# Patient Record
Sex: FEMALE | Race: WHITE | NOT HISPANIC OR LATINO | Employment: PART TIME | URBAN - METROPOLITAN AREA
[De-identification: names, ages, dates, MRNs, and addresses within clinical notes are randomized per-mention and may not be internally consistent; named-entity substitution may affect disease eponyms.]

---

## 2017-06-15 ENCOUNTER — ALLSCRIPTS OFFICE VISIT (OUTPATIENT)
Dept: OTHER | Facility: OTHER | Age: 57
End: 2017-06-15

## 2017-06-15 DIAGNOSIS — M25.561 PAIN IN RIGHT KNEE: ICD-10-CM

## 2017-06-15 DIAGNOSIS — R73.01 IMPAIRED FASTING GLUCOSE: ICD-10-CM

## 2017-06-15 DIAGNOSIS — E78.1 PURE HYPERGLYCERIDEMIA: ICD-10-CM

## 2017-06-15 DIAGNOSIS — I10 ESSENTIAL (PRIMARY) HYPERTENSION: ICD-10-CM

## 2017-10-13 ENCOUNTER — CONVERSION ENCOUNTER (OUTPATIENT)
Dept: RADIOLOGY | Facility: IMAGING CENTER | Age: 57
End: 2017-10-13

## 2017-10-13 ENCOUNTER — GENERIC CONVERSION - ENCOUNTER (OUTPATIENT)
Dept: OTHER | Facility: OTHER | Age: 57
End: 2017-10-13

## 2017-12-27 ENCOUNTER — ALLSCRIPTS OFFICE VISIT (OUTPATIENT)
Dept: OTHER | Facility: OTHER | Age: 57
End: 2017-12-27

## 2017-12-28 NOTE — PROGRESS NOTES
Assessment   1  Benign essential HTN (401 1) (I10)   2  BMI 40 0-44 9, adult (V85 41) (Z68 41)   3  Hypertriglyceridemia (272 1) (E78 1)   4  Impaired fasting glucose (790 21) (R73 01)    Plan   Benign essential HTN    · HydroCHLOROthiazide 25 MG Oral Tablet; Take 1 tablet daily   · Metoprolol Succinate  MG Oral Tablet Extended Release 24 Hour; TAKE 1 TABLET    DAILY  Benign essential HTN, Health Maintenance    · Aspirin EC 81 MG Oral Tablet Delayed Release; TAKE 1 TABLET DAILY  Knee pain, right    · * XR KNEE 4+ VW RIGHT INJURY; Status:Canceled;   PMH: Screen for colon cancer    · COLONOSCOPY; Status:Active; Requested for:92Lwv8560; Unlinked    · Osteo-Bi-Flex TABS    Discussion/Summary   Counseling Documentation With Imm: The patient was counseled regarding diagnostic results,-- prognosis,-- impressions  Patient Education: Educational resources provided:    Medication SE Review and Pt Understands Tx: Possible side effects of new medications were reviewed with the patient/guardian today  Chief Complaint   Chief Complaint Free Text Note Form: pt  presents for follow up for HTN  Review Labs 12/18/17  pt  would like Med refills      History of Present Illness   Hypertension (Follow-Up): The patient presents for follow-up of essential hypertension  She has no significant interval events  Symptoms: denies impaired vision,-- denies dyspnea,-- denies chest pain,-- denies intermittent leg claudication-- and-- denies lower extremity edema  The patient presents with complaints of mild headache, described as dull, radiating to the bilateral temporal area  Symptoms are improved by NSAIDs  Home monitoring: The patient checks her blood pressure sporadically  Blood pressure control has been good  Lifestyle: Diet: She consumes a diverse and healthy diet  Exercise: She does not exercise regularly  Smoking: She does not use tobacco Alcohol: She consumes alcohol  She reports occasional alcohol use  Drug Use: She denies drug use  (Patient intends to begin walking for exercise)  Medications: the patient is adherent with her medication regimen  -- She denies medication side effects  Additional History: Patient checks blood pressure at home, which normally runs around 130/70s  Diabetes Type II Management Pathway: The patient is being seen for follow-up for diabetes mellitus type II  Disease Course and Complications: (Fasting blood sugars were slightly elevated before hemoglobin A1c is 5 4 and the recent fasting blood sugar is totally normal continue with the weight loss and the exercise and diet to prevent the diabetes and the long run)  Hypertriglyceridemia (Brief): The patient is being seen for a routine clinic follow-up of hypertriglyceridemia  (Patient has a triglyceride level of 546 which is certainly not too bad I told her to cut down on her sugar and the sweets and will follow it up her total cholesterol LDL and HDL are within normal limits)      Review of Systems   Complete-Female:      Constitutional: no fever,-- no chills-- and-- not feeling tired  Eyes: no eyesight problems-- and-- no purulent discharge from the eyes  ENT: no earache,-- no sore throat-- and-- no hoarseness  Cardiovascular: no chest pain-- and-- no palpitations  Respiratory: no shortness of breath,-- no cough,-- no wheezing-- and-- no shortness of breath during exertion  Gastrointestinal: no abdominal pain,-- no nausea,-- no constipation-- and-- no diarrhea  Genitourinary: dysuria, but-- no pelvic pain-- and-- no incontinence  Psychiatric: no sleep disturbances  Endocrine: no muscle weakness  Active Problems   1  Anemia (285 9) (D64 9)   2  Benign essential HTN (401 1) (I10)   3  BMI 40 0-44 9, adult (V85 41) (Z68 41)   4  Cervical cancer screening (V76 2) (Z12 4)   5  Chronic venous insufficiency (459 81) (I87 2)   6   GERD without esophagitis (530 81) (K21 9)   7  Hypertriglyceridemia (272 1) (E78 1)   8  Impaired fasting glucose (790 21) (R73 01)   9  Knee pain, right (719 46) (M25 561)   10  Loss of height (781 91) (R29 890)   11  Obesity, Class III, BMI 40-49 9 (morbid obesity) (278 01) (E66 01)   12  Varicose veins (454 9) (I83 90)    Past Medical History   1  History of Acute laryngitis (464 00) (J04 0)   2  History of Headache (784 0) (R51)   3  History of hypertension (V12 59) (Z86 79)   4  History of Screening for osteoporosis (V82 81) (A72 235)    Surgical History   1  History of Gynecologic Services Thermal Endometrial Ablation    Family History   Mother    1  Family history of Benign essential HTN   2  Family history of Recent heart attack  Father    3  Family history of Benign essential HTN    Social History    · Former smoker (V15 82) (F66 790)   · No illicit drug use   · Occasional alcohol use    Current Meds    1  Aspirin EC 81 MG Oral Tablet Delayed Release; TAKE 1 TABLET DAILY; Therapy: 47ILC0893 to (Evaluate:88Xxq0511)  Requested for: 12JBI8400; Last Rx:15Jun2017     Ordered   2  Calcium + D TABS; TAKE 1 TABLET DAILY; Therapy: (Recorded:68Dxd0922) to Recorded   3  Centrum Silver Oral Tablet; Take 1 tablet daily; Therapy: (Recorded:29Dnf5668) to Recorded   4  Fish Oil 1000 MG Oral Capsule; TAKE 2 CAPSULE Daily; Therapy: 19SPS2307 to (Evaluate:48Xgt9019)  Requested for: 17YZB5152; Last Rx:15Jun2017     Ordered   5  HydroCHLOROthiazide 25 MG Oral Tablet; Take 1 tablet daily  Requested for: 19FNM5663; Last     Rx:99Iiq3085 Ordered   6  Metoprolol Succinate  MG Oral Tablet Extended Release 24 Hour; TAKE 1 TABLET DAILY; Therapy: 02WHD0538 to (Kierra Cooper)  Requested for: 92DJW0999; Last Rx:74Xjk8907     Ordered   7  Move Free TABS; Take 1 tablet daily; Therapy: (Recorded:74Ftw1254) to Recorded   8  Osteo-Bi-Flex TABS; take 2 tablet daily;      Therapy: (Recorded:20Azs2020) to Recorded  Medication List Reviewed: The medication list was reviewed and updated today  Allergies   1  morphine   2  Percocet TABS   3  Vicodin TABS  4  No Known Environmental Allergies   5  No Known Food Allergies    Vitals   Vital Signs    Recorded: 03HFF0318 09:16AM   Temperature 96 9 F, Tympanic   Heart Rate 85   Systolic 956, LUE, Sitting   Diastolic 88, LUE, Sitting   Height 5 ft 5 25 in   Weight 272 lb 4 oz   BMI Calculated 44 96   BSA Calculated 2 26   O2 Saturation 98, RA     Physical Exam        Constitutional      General appearance: No acute distress, well appearing and well nourished  Eyes      Conjunctiva and lids: No swelling, erythema or discharge  Ears, Nose, Mouth, and Throat      External inspection of ears and nose: Normal        Pulmonary      Respiratory effort: No increased work of breathing or signs of respiratory distress  Auscultation of lungs: Clear to auscultation  Cardiovascular      Auscultation of heart: Normal rate and rhythm, normal S1 and S2, without murmurs  Examination of extremities for edema and/or varicosities: Normal        Carotid pulses: Normal        Abdomen      Abdomen: Non-tender, no masses  Liver and spleen: No hepatomegaly or splenomegaly  Musculoskeletal      Gait and station: Normal        Digits and nails: Normal without clubbing or cyanosis  Inspection/palpation of joints, bones, and muscles: Normal        Skin      Skin and subcutaneous tissue: Normal without rashes or lesions  -- spider veins b/l lower legs        Psychiatric      Mood and affect: Normal           Results/Data   (1) HEMOGLOBIN A1C 92NAD6043 04:10PM Dewayne Gist      Test Name Result Flag Reference   HEMOGLOBIN A1C 5 4        Summary / No summary entered :        No summary entered  Documents attached :        [address] Work - [facility], team; Enc: [id number] - Image Encounter - [facility], team -        (Interventional Medicine) (Additional Information Document)  (1) LIPID PANEL, FASTING 64BZD1271 04:10PM Mimi Dalton      Test Name Result Flag Reference   LDL CHOLESTEROL CALCULATED 90        Summary / No summary entered :        No summary entered  Documents attached :        [address] Work - [facility], team; Enc: [id number] - Image Encounter - [facility], team -        (Interventional Medicine) (Additional Information Document)  (1) COMPREHENSIVE METABOLIC PANEL 05HYP1698 74:67JN Mimi Dalton      Test Name Result Flag Reference   [facility] 87        Summary / No summary entered :        No summary entered  Documents attached :        [address] Work - [facility], team; Enc: [id number] - Image Encounter - [facility], team -        (Interventional Medicine) (Additional Information Document)    Health Management   Cervical cancer screening   (every) THINPREP PAP; every 3 years; Next Due: 27AZI4038; Overdue  History of Other screening mammogram   Digital Bilateral Screening Mammogram With CAD; every 1 year; Last 89VAN5292; Next Due:    19TFX8919; Overdue  History of Screen for colon cancer   COLONOSCOPY; every 10 years; Next Due: 78NXH0761; Overdue  History of Screening for osteoporosis   * Dexa Scan Axial Skel (Hip, Pelvis, Spine); every 2 years; Last 84Oxo1123; Next Due: 33Ehj0699; Overdue  Health Maintenance   (every) THINPREP PAP; every 3 years; Next Due: 84AHW8909; Overdue  *VB-Depression Screening; every 1 year; Last 56CVQ5963; Next Due: 19Ppo0844;  Overdue    Signatures    Electronically signed by : Ann-Marie Conklin MD; Dec 27 2017  9:37AM EST                       (Author)

## 2018-01-14 VITALS
HEART RATE: 76 BPM | SYSTOLIC BLOOD PRESSURE: 138 MMHG | DIASTOLIC BLOOD PRESSURE: 86 MMHG | WEIGHT: 271 LBS | OXYGEN SATURATION: 94 % | BODY MASS INDEX: 45.15 KG/M2 | HEIGHT: 65 IN | TEMPERATURE: 98.3 F

## 2018-01-17 NOTE — PROGRESS NOTES
Assessment    1  Benign essential HTN (401 1) (I10)   2  Anemia (285 9) (D64 9)   3  GERD without esophagitis (530 81) (K21 9)   4  Varicose veins (454 9) (I86 8)   5  Chronic venous insufficiency (459 81) (I87 2)   6  Impaired fasting glucose (790 21) (R73 01)    Plan  Benign essential HTN    · Hydrochlorothiazide 25 MG Oral Tablet; Take 1 tablet daily   · Metoprolol Succinate  MG Oral Tablet Extended Release 24 Hour; TAKE 1 TABLET  DAILY  Benign essential HTN, Health Maintenance    · Aspirin EC 81 MG Oral Tablet Delayed Release; TAKE 1 TABLET DAILY  Chronic venous insufficiency    · JOBST Gradient Compression Stocking; Status:Complete;   Done: 36KIT7533 09:41AM  Impaired fasting glucose    · (1) GLUCOSE,  FASTING; Status:Active; Requested AVX:39XJH1926;    · (1) HEMOGLOBIN A1C; Status:Active; Requested HWW:82WFB0065;     Discussion/Summary  Counseling Documentation With Imm: The patient was counseled regarding prognosis, impressions  Chief Complaint  Chief Complaint Free Text Note Form: Patient is here today for a follow up visit for hypertension  She is experiencing pain in her legs and she is concerned about the varicosities in the lower legs  Lab tests done 4/14/16 @ [facility]  Prescription refills needed  History of Present Illness  Hypertension (Follow-Up): The patient presents for follow-up of essential hypertension  The patient states she has been doing well with her blood pressure control since the last visit  Symptoms: denies impaired vision, denies dyspnea, denies chest pain, denies intermittent leg claudication and denies lower extremity edema  Associated symptoms include headache unchanged  alleviated by heat compress/excedrine migraine    The patient presents with complaints of gradual onset of mild bilateral frontal and bilateral occipital headache (persistent )  Additional History: UA negative for protein     Urinary Frequency:   Komal Chiang presents with complaints of urinary frequency (Patient reports chronic urinary frequency x1 year (>7x/day) Reports 5 days of dysuria  Denies hesitancy  Denies back pain or suprapubic pain  Last UA noted epithelial cells)   Associated symptoms include dysuria, nocturia and low back pain, but no urinary urgency, no hematuria, no fever, no suprapubic pain, no flank pain, no nausea, no vomiting, no diarrhea, no constipation and no weight loss  Anemia (Follow-Up): The patient is being seen for follow-up of anemia  The patient reports doing well  She has had no significant interval events  Interval symptoms:  denies fatigue, denies weakness, denies shortness of breath and denies lightheadedness  Associated symptoms: no chest pain  The patient is not currently on medication for this problem  Additional history: Hgb 13 5 HCT 41%   Review of Systems  Complete-Female:   Constitutional: no fever, no chills and not feeling tired  Eyes: no eyesight problems and no purulent discharge from the eyes  ENT: no earache, no sore throat and no hoarseness  Cardiovascular: no chest pain and no palpitations  Respiratory: no shortness of breath, no cough, no wheezing and no shortness of breath during exertion  Gastrointestinal: no abdominal pain, no nausea, no constipation and no diarrhea  Genitourinary: dysuria, but no pelvic pain and no incontinence  Psychiatric: no sleep disturbances  Endocrine: no muscle weakness  Active Problems    1  Anemia (285 9) (D64 9)   2  Benign essential HTN (401 1) (I10)   3  BMI 40 0-44 9, adult (V85 41) (Z68 41)   4  GERD without esophagitis (530 81) (K21 9)   5  Loss of height (781 91) (R29 890)   6  Other screening mammogram (V76 12) (Z12 31)   7  Screen for colon cancer (V76 51) (Z12 11)   8  Varicose veins (454 9) (I86 8)    Past Medical History    1  History of Headache (784 0) (R51)   2  History of hypertension (V12 59) (Z86 79)   3   History of Screening for osteoporosis (V82 81) (S80 355)    Surgical History    1  History of Gynecologic Services Thermal Endometrial Ablation  Surgical History Reviewed: The surgical history was reviewed and updated today  Family History  Mother    1  Family history of Benign essential HTN  Father    2  Family history of Benign essential HTN  Family History Reviewed: The family history was reviewed and updated today  Social History    · Denied: History of Drug use   · Former smoker (V15 82) (I05 206)   · Occasional alcohol use  Social History Reviewed: The social history was reviewed and updated today  The social history was reviewed and is unchanged  Current Meds   1  Calcium + D TABS; TAKE 1 TABLET DAILY; Therapy: (Recorded:91Ypv7371) to Recorded   2  Centrum Silver Oral Tablet; Take 1 tablet daily; Therapy: (Recorded:67Bad4919) to Recorded   3  Hydrochlorothiazide 25 MG Oral Tablet; Take 1 tablet daily  Requested for: 70Vue0571; Last   Rx:40Qtn2181 Ordered   4  Metoprolol Succinate  MG Oral Tablet Extended Release 24 Hour; TAKE 1 TABLET DAILY; Therapy: 72ERF0137 to (Evaluate:27Jun2016)  Requested for: 07TPD4698; Last Rx:77Vvt5384   Ordered   5  Osteo-Bi-Flex TABS; take 2 tablet daily; Therapy: (Recorded:68Asl8276) to Recorded  Medication List Reviewed: The medication list was reviewed and updated today  Allergies    1  morphine   2  Percocet TABS   3  Vicodin TABS    4  No Known Environmental Allergies   5  No Known Food Allergies    Vitals  Vital Signs [Data Includes: Current Encounter]    Recorded: [id number] 08:56AM   Temperature 97 2 F, Tympanic   Heart Rate 64, L Radial   Pulse Quality Regular, L Radial   Respiration 16   Systolic 408, LUE, Sitting   Diastolic 84, LUE, Sitting   Height 5 ft 5 in   Weight 256 lb 12 8 oz   BMI Calculated 42 73   BSA Calculated 2 2   O2 Saturation 98     Physical Exam    Constitutional   General appearance: No acute distress, well appearing and well nourished      Eyes   Conjunctiva and lids: No swelling, erythema or discharge  Ears, Nose, Mouth, and Throat   External inspection of ears and nose: Normal     Pulmonary   Respiratory effort: No increased work of breathing or signs of respiratory distress  Auscultation of lungs: Clear to auscultation  Cardiovascular   Auscultation of heart: Normal rate and rhythm, normal S1 and S2, without murmurs  Examination of extremities for edema and/or varicosities: Normal     Carotid pulses: Normal     Abdomen   Abdomen: Non-tender, no masses  Liver and spleen: No hepatomegaly or splenomegaly  Musculoskeletal   Gait and station: Normal     Digits and nails: Normal without clubbing or cyanosis  Inspection/palpation of joints, bones, and muscles: Normal     Skin   Skin and subcutaneous tissue: Normal without rashes or lesions  spider veins b/l lower legs  Psychiatric   Mood and affect: Normal          Health Management  History of Screening for osteoporosis   * Dexa Scan Axial Skel (Hip, Pelvis, Spine); every 2 years; Last 01Apr2015; Next Due: 01Apr2017; Active  Other screening mammogram   Digital Bilateral Screening Mammogram With CAD; every 1 year; Last 08MFV2655; Next Due:  22RJB4968; Near Due  Screen for colon cancer   COLONOSCOPY; every 10 years; Next Due: 82UCS7888; Overdue  Health Maintenance   *VB-Depression Screening; every 1 year; Last 41CCK3605; Next Due: 82Baw9203;  Active    Signatures   Electronically signed by : Cindy Silva MD; Jun 16 2016  9:42AM EST                       (Author)

## 2018-01-22 VITALS
HEIGHT: 65 IN | HEART RATE: 85 BPM | BODY MASS INDEX: 45.36 KG/M2 | SYSTOLIC BLOOD PRESSURE: 138 MMHG | DIASTOLIC BLOOD PRESSURE: 88 MMHG | OXYGEN SATURATION: 98 % | TEMPERATURE: 96.9 F | WEIGHT: 272.25 LBS

## 2018-06-25 RX ORDER — YEAST,DRIED (S. CEREVISIAE)
1 POWDER (GRAM) ORAL DAILY
COMMUNITY
End: 2020-12-29 | Stop reason: ALTCHOICE

## 2018-06-25 RX ORDER — HYDROCHLOROTHIAZIDE 25 MG/1
1 TABLET ORAL DAILY
COMMUNITY
End: 2018-06-27 | Stop reason: SDUPTHER

## 2018-06-25 RX ORDER — METOPROLOL SUCCINATE 100 MG/1
1 TABLET, EXTENDED RELEASE ORAL DAILY
COMMUNITY
Start: 2015-02-05 | End: 2018-06-27 | Stop reason: SDUPTHER

## 2018-06-25 RX ORDER — ASPIRIN 81 MG/1
1 TABLET ORAL DAILY
COMMUNITY
Start: 2016-06-16 | End: 2018-06-27 | Stop reason: SDUPTHER

## 2018-06-25 RX ORDER — LANOLIN ALCOHOL/MO/W.PET/CERES
1 CREAM (GRAM) TOPICAL
COMMUNITY

## 2018-06-25 RX ORDER — AMOXICILLIN 500 MG
1 CAPSULE ORAL EVERY OTHER DAY
COMMUNITY
Start: 2017-06-15

## 2018-06-25 RX ORDER — MULTIVIT WITH MINERALS/LUTEIN
1 TABLET ORAL DAILY
COMMUNITY

## 2018-06-26 ENCOUNTER — OFFICE VISIT (OUTPATIENT)
Dept: INTERNAL MEDICINE CLINIC | Age: 58
End: 2018-06-26
Payer: COMMERCIAL

## 2018-06-26 VITALS
SYSTOLIC BLOOD PRESSURE: 142 MMHG | DIASTOLIC BLOOD PRESSURE: 88 MMHG | OXYGEN SATURATION: 97 % | HEART RATE: 64 BPM | WEIGHT: 267.4 LBS | TEMPERATURE: 98.4 F | HEIGHT: 66 IN | BODY MASS INDEX: 42.97 KG/M2

## 2018-06-26 DIAGNOSIS — M25.669 STIFFNESS OF JOINT OF LOWER LEG: ICD-10-CM

## 2018-06-26 DIAGNOSIS — Z12.11 COLON CANCER SCREENING: ICD-10-CM

## 2018-06-26 DIAGNOSIS — M79.604 PAIN IN BOTH LOWER EXTREMITIES: ICD-10-CM

## 2018-06-26 DIAGNOSIS — Z13.820 SCREENING FOR OSTEOPOROSIS: ICD-10-CM

## 2018-06-26 DIAGNOSIS — I10 BENIGN ESSENTIAL HTN: ICD-10-CM

## 2018-06-26 DIAGNOSIS — R73.01 IMPAIRED FASTING GLUCOSE: ICD-10-CM

## 2018-06-26 DIAGNOSIS — M79.605 PAIN IN BOTH LOWER EXTREMITIES: ICD-10-CM

## 2018-06-26 DIAGNOSIS — D64.9 ANEMIA, UNSPECIFIED TYPE: ICD-10-CM

## 2018-06-26 DIAGNOSIS — Z11.59 ENCOUNTER FOR HEPATITIS C SCREENING TEST FOR LOW RISK PATIENT: Primary | ICD-10-CM

## 2018-06-26 PROBLEM — E66.01 OBESITY, CLASS III, BMI 40-49.9 (MORBID OBESITY) (HCC): Status: ACTIVE | Noted: 2017-06-15

## 2018-06-26 PROBLEM — M25.561 KNEE PAIN, RIGHT: Status: ACTIVE | Noted: 2017-06-15

## 2018-06-26 PROBLEM — E78.1 HYPERTRIGLYCERIDEMIA: Status: ACTIVE | Noted: 2017-06-15

## 2018-06-26 PROBLEM — E66.813 OBESITY, CLASS III, BMI 40-49.9 (MORBID OBESITY): Status: ACTIVE | Noted: 2017-06-15

## 2018-06-26 PROCEDURE — 99214 OFFICE O/P EST MOD 30 MIN: CPT | Performed by: NURSE PRACTITIONER

## 2018-06-26 NOTE — PROGRESS NOTES
Assessment/Plan:      Patient will schedule a follow-up appointment in 6 months with Dr Doug Maria  I did tell patient that I will call her with her lab results and if there is any signs of rheumatoid arthritis, I recommend that she come in for a an appointment to discuss this issue  Diagnoses and all orders for this visit:    Encounter for hepatitis C screening test for low risk patient  -     Hepatitis C antibody; Future    Benign essential HTN    Patient's blood pressure is elevated on today's visit  She states that she does check her blood pressure when she visits her parents and her last check revealed that it was 120/70  I did not increase patient's medication at this time but advised her to take her blood pressure on a weekly basis and to call the office should her blood pressures be elevated consistently over 140 over 90   -     CBC and differential; Future  -     Comprehensive metabolic panel; Future  -     TSH, 3rd generation with T4 reflex; Future    Stiffness of joint of lower leg   patient does have a family history on her paternal side of rheumatoid arthritis  She states  That her father was diagnosed before he turned 48  I will order rheumatoid factors to rule out rheumatoid arthritis  She does state that Aleve does help her symptoms  Her symptoms may be related to her lower back issues (degenerative disc disease) or she may have osteoarthritis  I did encourage her to continue her walking program as this will help her symptoms  -     Cyclic citrul peptide antibody, IgG; Future  -     Sedimentation rate, automated; Future  -     C-reactive protein; Future  -     RF Screen w/ Reflex to Titer; Future    Pain in both lower extremities    See above  -     Cyclic citrul peptide antibody, IgG; Future  -     Sedimentation rate, automated; Future  -     C-reactive protein; Future  -     RF Screen w/ Reflex to Titer;  Future    Impaired fasting glucose    Patient did have fasting glucose elevations in the past   Her most recent hemoglobin A1c in December 2017 was 5 4   -     Comprehensive metabolic panel; Future    Colon cancer screening    Patient is scheduled for routine colonoscopy next month  Screening for osteoporosis    Patient is last DEXA scan was in the spring of 2015  I will reorder a DEXA scan at this time  She does have a diagnosis of loss of height  -     DXA bone density spine hip and pelvis; Future    Anemia, unspecified type  -     CBC and differential; Future  -     Comprehensive metabolic panel; Future  -     TSH, 3rd generation with T4 reflex; Future    Other orders  -     aspirin (ECOTRIN LOW STRENGTH) 81 mg EC tablet; Take 1 tablet by mouth daily  -     calcium citrate-vitamin D (CITRACAL+D) 315-200 MG-UNIT per tablet; Take 1 tablet by mouth daily  -     Multiple Vitamins-Minerals (CENTRUM SILVER) tablet; Take 1 tablet by mouth daily  -     Omega-3 Fatty Acids (FISH OIL) 1,000 mg; Take 2 capsules by mouth daily  -     Glucos-Chond-Hyal Ac-Ca Fructo (MOVE FREE JOINT HEALTH ADVANCE) TABS; Take 1 tablet by mouth daily  -     metoprolol succinate (TOPROL-XL) 100 mg 24 hr tablet; Take 1 tablet by mouth daily  -     hydrochlorothiazide (HYDRODIURIL) 25 mg tablet; Take 1 tablet by mouth daily        Subjective:      Patient ID: Saeid Jamison is a 65 y o  female  Patient is being seen for 6 month follow-up for her hypertension, obesity, hypertriglyceridemia, and impaired fasting glucose  Patient states that 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 and there was no glucose found in her urine  She was under the impression that this meant that she did not have elevated blood glucose  Patient's only complaint at this time is of bilateral lower extremity stiffness and and pain which she has had for the last couple of months  Patient does have degenerative discs in her lumbar spine  She states that Aleve does help her symptoms    She did start walking approximately 2 weeks ago now that she is out of work over summer break  Hypertension - She is exercising and is adherent to a low-salt diet  Blood pressure is well controlled at home  Cardiac symptoms: none  Patient denies: chest pain, chest pressure/discomfort, dyspnea, exertional chest pressure/discomfort, fatigue, irregular heart beat, lower extremity edema and palpitations  Cardiovascular risk factors: dyslipidemia, obesity (BMI >= 30 kg/m2) and sedentary lifestyle  Use of agents associated with hypertension: none  History of target organ damage: none  Hypertriglyceridemia - The patient does not use medications that may worsen dyslipidemias (corticosteroids, progestins, anabolic steroids, diuretics, beta-blockers, amiodarone, cyclosporine, olanzapine)  The patient exercises infrequently  The patient is not known to have coexisting coronary artery disease  Cardiac Risk Factors  Age > 45-male, > 55-female:  YES  +1  Smoking:   NO  Sig  family hx of CHD*:  YES  +1  Hypertension:   YES  +1  Diabetes:   NO  HDL < 35:   NO  HDL > 59:   NO  Total: 3    Patient has lost 5 pounds since her last visit in December 2018  She has started to walk for exercise since she has been out of work on summer break  Patient has not had repeat blood work since her appointment in December 2017  At that time her CBC and CMP were within normal limits  Her hemoglobin A1c was 5 4  Cholesterol levels revealed total cholesterol 172, triglycerides elevated at 172, HDL 48, and LDL of 90  At her last office visit and was recommended that she continue with weight loss through diet and exercise  She was also instructed to cut down on her sugar intake  The following portions of the patient's history were reviewed and updated as appropriate: allergies, current medications, past family history, past medical history, past social history, past surgical history and problem list     Review of Systems   Constitutional: Negative  HENT: Negative      Respiratory: Negative for cough, chest tightness, shortness of breath and wheezing  Cardiovascular: Negative for chest pain, palpitations and leg swelling  Gastrointestinal: Negative for abdominal distention, abdominal pain, constipation, diarrhea, nausea and vomiting  Genitourinary: Negative  Musculoskeletal:        Bilateral lower extremity stiffness and pain  Skin: Negative  Neurological: Negative for dizziness, syncope, light-headedness, numbness and headaches  Hematological: Negative  Psychiatric/Behavioral: Negative  Objective:      /88 (BP Location: Left arm, Patient Position: Sitting, Cuff Size: Large)   Pulse 64   Temp 98 4 °F (36 9 °C) (Tympanic)   Ht 5' 5 65" (1 668 m)   Wt 121 kg (267 lb 6 4 oz)   SpO2 97% Comment: room air  BMI 43 62 kg/m²      Physical Exam   Constitutional: She is oriented to person, place, and time  She appears well-developed and well-nourished  HENT:   Head: Normocephalic and atraumatic  Eyes: Conjunctivae and EOM are normal  Pupils are equal, round, and reactive to light  Neck: Normal range of motion  Neck supple  No JVD present  No thyromegaly present  Cardiovascular: Normal rate and regular rhythm  Pulmonary/Chest: Effort normal and breath sounds normal    Abdominal: Soft  Bowel sounds are normal  She exhibits no distension  There is no tenderness  Musculoskeletal: Normal range of motion  She exhibits no edema  Varicose veins noted on bilateral LE   Lymphadenopathy:     She has no cervical adenopathy  Neurological: She is alert and oriented to person, place, and time  Skin: Skin is warm and dry  Psychiatric: She has a normal mood and affect   Her behavior is normal  Judgment and thought content normal

## 2018-06-27 DIAGNOSIS — I10 ESSENTIAL HYPERTENSION, BENIGN: Primary | ICD-10-CM

## 2018-06-27 RX ORDER — ASPIRIN 81 MG/1
81 TABLET ORAL DAILY
Qty: 90 TABLET | Refills: 1 | Status: SHIPPED | OUTPATIENT
Start: 2018-06-27 | End: 2019-04-04 | Stop reason: SDUPTHER

## 2018-06-27 RX ORDER — METOPROLOL SUCCINATE 100 MG/1
100 TABLET, EXTENDED RELEASE ORAL DAILY
Qty: 90 TABLET | Refills: 1 | Status: SHIPPED | OUTPATIENT
Start: 2018-06-27 | End: 2019-01-02 | Stop reason: SDUPTHER

## 2018-06-27 RX ORDER — HYDROCHLOROTHIAZIDE 25 MG/1
25 TABLET ORAL DAILY
Qty: 90 TABLET | Refills: 1 | Status: SHIPPED | OUTPATIENT
Start: 2018-06-27 | End: 2018-12-20 | Stop reason: SDUPTHER

## 2018-07-06 ENCOUNTER — HOSPITAL ENCOUNTER (OUTPATIENT)
Dept: RADIOLOGY | Facility: IMAGING CENTER | Age: 58
Discharge: HOME | End: 2018-07-06
Payer: COMMERCIAL

## 2018-07-06 DIAGNOSIS — Z13.820 SCREENING FOR OSTEOPOROSIS: ICD-10-CM

## 2018-07-06 PROCEDURE — 77080 DXA BONE DENSITY AXIAL: CPT

## 2018-07-10 ENCOUNTER — TELEPHONE (OUTPATIENT)
Dept: INTERNAL MEDICINE CLINIC | Facility: CLINIC | Age: 58
End: 2018-07-10

## 2018-07-10 NOTE — TELEPHONE ENCOUNTER
Patient should stop aspirin according to the recommendation of the doctor doing the colonoscopy    She does not need to take her blood pressure the morning of the colonoscopy but can take it later on in the day when she eats a regular meal

## 2018-07-10 NOTE — TELEPHONE ENCOUNTER
PATIENT CALLED SHE IS SCHEDULED FOR A COLONOSCOPY AND WONDERING ABOUT TAKING HER BP MEDICATION DAY OF?  AND ALSO SHE TAKES ASPIRIN AND WANTS TO KNOW IF SHE SHOULD STOP SO MANY DAYS BEFORE PROCEDURE, ITS TUES 7/17/18

## 2018-08-06 ENCOUNTER — TELEPHONE (OUTPATIENT)
Dept: INTERNAL MEDICINE CLINIC | Age: 58
End: 2018-08-06

## 2018-08-06 DIAGNOSIS — I10 ESSENTIAL HYPERTENSION, BENIGN: ICD-10-CM

## 2018-08-06 NOTE — TELEPHONE ENCOUNTER
Will have patient set up an appointment to be seen  Patient reports elevated SBP 140s and DBP's 70s and 80s  She is unsure how accurate her BP cuff is

## 2018-08-06 NOTE — TELEPHONE ENCOUNTER
Iram---Patient called her b/p is elevated still she would like to know if you would like her to up her toprol she just got a new script of the 100mg she wants to know if she should double her dose it is ok to leave a message

## 2018-08-09 ENCOUNTER — OFFICE VISIT (OUTPATIENT)
Dept: INTERNAL MEDICINE CLINIC | Facility: CLINIC | Age: 58
End: 2018-08-09
Payer: COMMERCIAL

## 2018-08-09 VITALS
BODY MASS INDEX: 42.72 KG/M2 | WEIGHT: 265.8 LBS | SYSTOLIC BLOOD PRESSURE: 132 MMHG | HEART RATE: 74 BPM | DIASTOLIC BLOOD PRESSURE: 90 MMHG | TEMPERATURE: 98 F | OXYGEN SATURATION: 97 % | HEIGHT: 66 IN

## 2018-08-09 DIAGNOSIS — I10 BENIGN ESSENTIAL HYPERTENSION: Primary | ICD-10-CM

## 2018-08-09 PROCEDURE — 99213 OFFICE O/P EST LOW 20 MIN: CPT | Performed by: NURSE PRACTITIONER

## 2018-08-09 RX ORDER — LISINOPRIL 10 MG/1
10 TABLET ORAL DAILY
Qty: 30 TABLET | Refills: 2 | Status: SHIPPED | OUTPATIENT
Start: 2018-08-09 | End: 2018-08-24 | Stop reason: DRUGHIGH

## 2018-08-09 NOTE — PROGRESS NOTES
Assessment/Plan:     Diagnoses and all orders for this visit:    Benign essential hypertension    Patient's blood pressure is still elevated and I will add lisinopril 10 mg daily to her anti hypertensive regimen  I did inform her of a possible side effect of dry cough  She states her 's been on this medication for years and never developed a cough  I did tell her that she could develop cough within a week a month I year 10 years and if this develops she should stop the medication and call our office  She will come to the office in 2 weeks for just a blood pressure check  If her blood pressure is controlled at that time, her medication can be changed to a lisinopril HCTZ combo when she is in need of refills in the future  -     lisinopril (ZESTRIL) 10 mg tablet; Take 1 tablet (10 mg total) by mouth daily        Subjective:      Patient ID: Fletcher Hoang is a 65 y o  female  Patient being seen in the office today due to elevated blood pressure at home  She called the office with concern over an elevated blood pressure and questioned if she should increase her Toprol  mg to 200 mg daily  Today she states that she used to be on 200 mg of Toprol daily but this was decreased to 100 when her blood pressure is controlled  She also takes hydrochlorothiazide 25 mg daily  On today's visit patient's pulse was 74 patient states that her heart rate is usually between the 70s and 80s  On her last visit her pulse was 64  she that she would like her blood pressure lowered due to risk of Alzheimer's  She read an article relating that to and is very concerned with this issue  The following portions of the patient's history were reviewed and updated as appropriate: allergies, current medications, past family history, past medical history, past social history, past surgical history and problem list     Review of Systems   Constitutional: Negative      Respiratory: Negative for cough, chest tightness, shortness of breath and wheezing  Cardiovascular: Negative for chest pain, palpitations and leg swelling  Gastrointestinal: Negative for abdominal distention, abdominal pain, constipation, diarrhea, nausea and vomiting  Skin: Negative  Neurological: Negative for dizziness, light-headedness and headaches  Hematological: Negative  Objective:    /90 (BP Location: Left arm, Patient Position: Sitting, Cuff Size: Large)   Pulse 74   Temp 98 °F (36 7 °C) (Tympanic)   Ht 5' 5 5" (1 664 m)   Wt 121 kg (265 lb 12 8 oz)   SpO2 97%   BMI 43 56 kg/m²      Physical Exam   Constitutional: She is oriented to person, place, and time  She appears well-developed and well-nourished  HENT:   Head: Normocephalic and atraumatic  Eyes: Conjunctivae and EOM are normal  Pupils are equal, round, and reactive to light  Neck: Normal range of motion  Neck supple  No JVD present  No thyromegaly present  Cardiovascular: Normal rate and regular rhythm  Pulmonary/Chest: Effort normal and breath sounds normal    Musculoskeletal: Normal range of motion  She exhibits no edema  Lymphadenopathy:     She has no cervical adenopathy  Neurological: She is alert and oriented to person, place, and time  Skin: Skin is warm and dry

## 2018-08-09 NOTE — PATIENT INSTRUCTIONS
Continue Toprol and HCTZ as directed  Start Lisinopril 10 mg daily  Call office should you have any side effects from new medication  Schedule BP check (nurse visit) in 3-4 weeks

## 2018-08-24 ENCOUNTER — CLINICAL SUPPORT (OUTPATIENT)
Dept: INTERNAL MEDICINE CLINIC | Facility: CLINIC | Age: 58
End: 2018-08-24
Payer: COMMERCIAL

## 2018-08-24 DIAGNOSIS — I10 BENIGN ESSENTIAL HYPERTENSION: ICD-10-CM

## 2018-08-24 DIAGNOSIS — I10 BENIGN ESSENTIAL HYPERTENSION: Primary | ICD-10-CM

## 2018-08-24 PROCEDURE — 99211 OFF/OP EST MAY X REQ PHY/QHP: CPT

## 2018-08-24 RX ORDER — LISINOPRIL 20 MG/1
20 TABLET ORAL DAILY
Qty: 30 TABLET | Refills: 1 | Status: SHIPPED | OUTPATIENT
Start: 2018-08-24 | End: 2018-08-27 | Stop reason: SDUPTHER

## 2018-08-24 RX ORDER — LISINOPRIL 10 MG/1
10 TABLET ORAL DAILY
Qty: 90 TABLET | Refills: 1 | Status: CANCELLED | OUTPATIENT
Start: 2018-08-24

## 2018-08-24 NOTE — TELEPHONE ENCOUNTER
Will d/w tyree pt was to see her back in 2 weeks for BP check, also recommended combo med if well controlled, tyree also sent 30 with 2 refills on aug 8th

## 2018-08-24 NOTE — TELEPHONE ENCOUNTER
Phoned patient and left message for her to return my call regarding her BP today  Based on today's BP, she will need her anti-hypertensive meds changed or increased  I asked her to call me back

## 2018-08-24 NOTE — TELEPHONE ENCOUNTER
Spoke with patient  She states her BP today was 158/85 then 128/82  Patient is presently taking toprol  mg daily, lisinopril 10 mg daily and HCTZ daily  Will increase lisiniopril to 20 mg daily  BP check in 2-3 weeks with further discussion of her medications based on that result

## 2018-08-27 DIAGNOSIS — I10 BENIGN ESSENTIAL HYPERTENSION: ICD-10-CM

## 2018-08-27 RX ORDER — LISINOPRIL 20 MG/1
20 TABLET ORAL DAILY
Qty: 90 TABLET | Refills: 1 | Status: SHIPPED | OUTPATIENT
Start: 2018-08-27 | End: 2018-12-20 | Stop reason: SDUPTHER

## 2018-09-25 ENCOUNTER — TELEPHONE (OUTPATIENT)
Dept: INTERNAL MEDICINE CLINIC | Facility: CLINIC | Age: 58
End: 2018-09-25

## 2018-09-25 NOTE — TELEPHONE ENCOUNTER
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

## 2018-10-17 ENCOUNTER — TELEPHONE (OUTPATIENT)
Dept: INTERNAL MEDICINE CLINIC | Facility: CLINIC | Age: 58
End: 2018-10-17

## 2018-10-17 NOTE — TELEPHONE ENCOUNTER
Calling for lab results that she had back in August   She was just following up because she never heard anything

## 2018-10-18 NOTE — TELEPHONE ENCOUNTER
Pt called  Reviewed labs  Slight elevation in Sed and CRP - reviewed with pt  She reports she is continuing to having LE myalgia  Patient states that Dr Yumiko Partida told her a long time ago that her varicose veins could be contributing  Patient was requesting a referral to vascular for repair  Discussed with patient that she should be receding to evaluate lower extremity myalgia, need for appropriate referral as it has been a while since she has been seen for this reason  Call transferred to front to make appt  No questions

## 2018-10-25 ENCOUNTER — OFFICE VISIT (OUTPATIENT)
Dept: INTERNAL MEDICINE CLINIC | Age: 58
End: 2018-10-25
Payer: COMMERCIAL

## 2018-10-25 VITALS
WEIGHT: 265.8 LBS | HEART RATE: 73 BPM | BODY MASS INDEX: 42.72 KG/M2 | HEIGHT: 66 IN | OXYGEN SATURATION: 99 % | SYSTOLIC BLOOD PRESSURE: 118 MMHG | DIASTOLIC BLOOD PRESSURE: 78 MMHG | TEMPERATURE: 97.9 F

## 2018-10-25 DIAGNOSIS — D64.9 ANEMIA, UNSPECIFIED TYPE: ICD-10-CM

## 2018-10-25 DIAGNOSIS — M79.604 PAIN IN BOTH LOWER EXTREMITIES: ICD-10-CM

## 2018-10-25 DIAGNOSIS — Z12.39 SCREENING FOR BREAST CANCER: ICD-10-CM

## 2018-10-25 DIAGNOSIS — R73.01 IMPAIRED FASTING GLUCOSE: Primary | ICD-10-CM

## 2018-10-25 DIAGNOSIS — Z12.11 SCREENING FOR COLON CANCER: ICD-10-CM

## 2018-10-25 DIAGNOSIS — I10 BENIGN ESSENTIAL HTN: ICD-10-CM

## 2018-10-25 DIAGNOSIS — M79.605 PAIN IN BOTH LOWER EXTREMITIES: ICD-10-CM

## 2018-10-25 PROCEDURE — 99214 OFFICE O/P EST MOD 30 MIN: CPT | Performed by: INTERNAL MEDICINE

## 2018-10-25 NOTE — PROGRESS NOTES
Assessment/Plan:    No problem-specific Assessment & Plan notes found for this encounter  Diagnoses and all orders for this visit:    Impaired fasting glucose   blood workup reviewed with the patient the fasting blood sugar which was high before it is totally normal  Screening for colon cancer  -     Ambulatory referral to Gastroenterology; Future    Benign essential HTN   hypertension is very well controlled  Anemia, unspecified type   anemia is resolved  Pain in both lower extremities   complaining of the pain below the knee mostly in the calf area and also behind the knee area mostly at night when she relax or when she reclines herself and put the legs up,  No chest pain or shortness of breath no increasing swelling of the leg she had a bilateral spider and the varicose veins  She did have problems with her knee with some arthritic changes and also meniscal tear  Her sed rate and C-reactive proteins are elevated we will get the CK and aldolase for assessment of the muscle inflammation I do not think so it is DVT and also I do not think so it is neuropathy  Certainly for long-term I recommend diet exercise and weight loss which will improve her lower extremity pain and also the knee pain        Subjective:    bilateral lower extremity pain as given above   Patient ID: Braulio Kumar is a 65 y o  female  HPI    The following portions of the patient's history were reviewed and updated as appropriate: allergies, current medications, past family history, past medical history, past social history, past surgical history and problem list     Review of Systems   Constitutional: Negative  Respiratory: Negative for cough, chest tightness, shortness of breath and wheezing  Cardiovascular: Negative for chest pain, palpitations and leg swelling  Gastrointestinal: Negative for abdominal distention, abdominal pain, constipation, diarrhea, nausea and vomiting  Musculoskeletal: Positive for arthralgias     Skin: Negative  Neurological: Negative for dizziness, light-headedness and headaches  Hematological: Negative            Past Medical History:   Diagnosis Date    Allergic     Anemia     Enlarged thyroid     GERD (gastroesophageal reflux disease)     Hypertension     Obesity          Current Outpatient Prescriptions:     aspirin (ECOTRIN LOW STRENGTH) 81 mg EC tablet, Take 1 tablet (81 mg total) by mouth daily, Disp: 90 tablet, Rfl: 1    calcium citrate-vitamin D (CITRACAL+D) 315-200 MG-UNIT per tablet, Take 1 tablet by mouth daily, Disp: , Rfl:     Glucos-Chond-Hyal Ac-Ca Fructo (MOVE FREE JOINT HEALTH ADVANCE) TABS, Take 1 tablet by mouth daily, Disp: , Rfl:     hydrochlorothiazide (HYDRODIURIL) 25 mg tablet, Take 1 tablet (25 mg total) by mouth daily, Disp: 90 tablet, Rfl: 1    lisinopril (ZESTRIL) 20 mg tablet, Take 1 tablet (20 mg total) by mouth daily, Disp: 90 tablet, Rfl: 1    metoprolol succinate (TOPROL-XL) 100 mg 24 hr tablet, Take 1 tablet (100 mg total) by mouth daily, Disp: 90 tablet, Rfl: 1    Multiple Vitamins-Minerals (CENTRUM SILVER) tablet, Take 1 tablet by mouth daily, Disp: , Rfl:     Omega-3 Fatty Acids (FISH OIL) 1,000 mg, Take 2 capsules by mouth daily  , Disp: , Rfl:     Allergies   Allergen Reactions    Hydrocodone-Acetaminophen GI Intolerance    Morphine Vomiting    Percocet [Oxycodone-Acetaminophen] GI Intolerance       Social History   Past Surgical History:   Procedure Laterality Date    ELBOW SURGERY      [place] no surgery    ENDOMETRIAL ABLATION      WISDOM TOOTH EXTRACTION       Family History   Problem Relation Age of Onset    Hypertension Mother         benign essential     Heart attack Mother         recent     Hypertension Father         benign essential     Arthritis Father        Objective:  /78 (BP Location: Left arm, Patient Position: Sitting, Cuff Size: Large)   Pulse 73   Temp 97 9 °F (36 6 °C) (Tympanic)   Ht 5' 5 55" (1 665 m)   Wt 121 kg (265 lb 12 8 oz)   SpO2 99%   BMI 43 49 kg/m²        Physical Exam   Constitutional: She is oriented to person, place, and time  She appears well-developed and well-nourished  HENT:   Head: Normocephalic and atraumatic  Eyes: Pupils are equal, round, and reactive to light  Conjunctivae and EOM are normal    Neck: Normal range of motion  Neck supple  No JVD present  No thyromegaly present  Cardiovascular: Normal rate and regular rhythm  Pulmonary/Chest: Effort normal and breath sounds normal    Musculoskeletal: Normal range of motion  She exhibits no edema  Lymphadenopathy:     She has no cervical adenopathy  Neurological: She is alert and oriented to person, place, and time  Skin: Skin is warm and dry  Nursing note and vitals reviewed  No results found for this or any previous visit (from the past 672 hour(s))

## 2018-10-29 ENCOUNTER — HOSPITAL ENCOUNTER (OUTPATIENT)
Dept: RADIOLOGY | Facility: IMAGING CENTER | Age: 58
Discharge: HOME | End: 2018-10-29
Payer: COMMERCIAL

## 2018-10-29 ENCOUNTER — APPOINTMENT (OUTPATIENT)
Dept: LAB | Facility: IMAGING CENTER | Age: 58
End: 2018-10-29
Payer: COMMERCIAL

## 2018-10-29 ENCOUNTER — TRANSCRIBE ORDERS (OUTPATIENT)
Dept: ADMINISTRATIVE | Facility: HOSPITAL | Age: 58
End: 2018-10-29

## 2018-10-29 DIAGNOSIS — M79.604 PAIN IN BOTH LOWER EXTREMITIES: ICD-10-CM

## 2018-10-29 DIAGNOSIS — R73.01 IMPAIRED FASTING GLUCOSE: ICD-10-CM

## 2018-10-29 DIAGNOSIS — I10 BENIGN ESSENTIAL HTN: ICD-10-CM

## 2018-10-29 DIAGNOSIS — M79.605 PAIN IN BOTH LOWER EXTREMITIES: ICD-10-CM

## 2018-10-29 DIAGNOSIS — D64.9 ANEMIA, UNSPECIFIED TYPE: ICD-10-CM

## 2018-10-29 DIAGNOSIS — Z11.59 ENCOUNTER FOR HEPATITIS C SCREENING TEST FOR LOW RISK PATIENT: ICD-10-CM

## 2018-10-29 LAB
ALBUMIN SERPL BCP-MCNC: 3.2 G/DL (ref 3.5–5)
ALP SERPL-CCNC: 106 U/L (ref 46–116)
ALT SERPL W P-5'-P-CCNC: 32 U/L (ref 12–78)
ANION GAP SERPL CALCULATED.3IONS-SCNC: 6 MMOL/L (ref 4–13)
AST SERPL W P-5'-P-CCNC: 24 U/L (ref 5–45)
BASOPHILS # BLD AUTO: 0.09 THOUSANDS/ΜL (ref 0–0.1)
BASOPHILS NFR BLD AUTO: 1 % (ref 0–1)
BILIRUB SERPL-MCNC: 0.25 MG/DL (ref 0.2–1)
BUN SERPL-MCNC: 17 MG/DL (ref 5–25)
CALCIUM SERPL-MCNC: 8.9 MG/DL (ref 8.3–10.1)
CHLORIDE SERPL-SCNC: 103 MMOL/L (ref 100–108)
CO2 SERPL-SCNC: 28 MMOL/L (ref 21–32)
CREAT SERPL-MCNC: 0.64 MG/DL (ref 0.6–1.3)
EOSINOPHIL # BLD AUTO: 0.23 THOUSAND/ΜL (ref 0–0.61)
EOSINOPHIL NFR BLD AUTO: 3 % (ref 0–6)
ERYTHROCYTE [DISTWIDTH] IN BLOOD BY AUTOMATED COUNT: 12.4 % (ref 11.6–15.1)
GFR SERPL CREATININE-BSD FRML MDRD: 100 ML/MIN/1.73SQ M
GLUCOSE SERPL-MCNC: 97 MG/DL (ref 65–140)
HCT VFR BLD AUTO: 40.7 % (ref 34.8–46.1)
HGB BLD-MCNC: 13.2 G/DL (ref 11.5–15.4)
IMM GRANULOCYTES # BLD AUTO: 0.03 THOUSAND/UL (ref 0–0.2)
IMM GRANULOCYTES NFR BLD AUTO: 0 % (ref 0–2)
LYMPHOCYTES # BLD AUTO: 2.03 THOUSANDS/ΜL (ref 0.6–4.47)
LYMPHOCYTES NFR BLD AUTO: 22 % (ref 14–44)
MCH RBC QN AUTO: 29.8 PG (ref 26.8–34.3)
MCHC RBC AUTO-ENTMCNC: 32.4 G/DL (ref 31.4–37.4)
MCV RBC AUTO: 92 FL (ref 82–98)
MONOCYTES # BLD AUTO: 0.8 THOUSAND/ΜL (ref 0.17–1.22)
MONOCYTES NFR BLD AUTO: 9 % (ref 4–12)
NEUTROPHILS # BLD AUTO: 6.09 THOUSANDS/ΜL (ref 1.85–7.62)
NEUTS SEG NFR BLD AUTO: 65 % (ref 43–75)
NRBC BLD AUTO-RTO: 0 /100 WBCS
PLATELET # BLD AUTO: 239 THOUSANDS/UL (ref 149–390)
PMV BLD AUTO: 11.6 FL (ref 8.9–12.7)
POTASSIUM SERPL-SCNC: 3.7 MMOL/L (ref 3.5–5.3)
PROT SERPL-MCNC: 7.4 G/DL (ref 6.4–8.2)
RBC # BLD AUTO: 4.43 MILLION/UL (ref 3.81–5.12)
SODIUM SERPL-SCNC: 137 MMOL/L (ref 136–145)
TSH SERPL DL<=0.05 MIU/L-ACNC: 0.56 UIU/ML (ref 0.36–3.74)
WBC # BLD AUTO: 9.27 THOUSAND/UL (ref 4.31–10.16)

## 2018-10-29 PROCEDURE — 73565 X-RAY EXAM OF KNEES: CPT

## 2018-10-29 PROCEDURE — 84443 ASSAY THYROID STIM HORMONE: CPT

## 2018-10-29 PROCEDURE — 86803 HEPATITIS C AB TEST: CPT

## 2018-10-29 PROCEDURE — 36415 COLL VENOUS BLD VENIPUNCTURE: CPT

## 2018-10-29 PROCEDURE — 85025 COMPLETE CBC W/AUTO DIFF WBC: CPT

## 2018-10-29 PROCEDURE — 80053 COMPREHEN METABOLIC PANEL: CPT

## 2018-10-30 LAB — HCV AB SER QL: NORMAL

## 2018-11-14 ENCOUNTER — OFFICE VISIT (OUTPATIENT)
Dept: INTERNAL MEDICINE CLINIC | Age: 58
End: 2018-11-14
Payer: COMMERCIAL

## 2018-11-14 VITALS
OXYGEN SATURATION: 97 % | DIASTOLIC BLOOD PRESSURE: 80 MMHG | HEART RATE: 82 BPM | TEMPERATURE: 97.7 F | SYSTOLIC BLOOD PRESSURE: 130 MMHG

## 2018-11-14 DIAGNOSIS — G89.29 CHRONIC PAIN OF RIGHT KNEE: ICD-10-CM

## 2018-11-14 DIAGNOSIS — I10 BENIGN ESSENTIAL HTN: ICD-10-CM

## 2018-11-14 DIAGNOSIS — M25.561 CHRONIC PAIN OF RIGHT KNEE: ICD-10-CM

## 2018-11-14 DIAGNOSIS — R73.01 IMPAIRED FASTING GLUCOSE: Primary | ICD-10-CM

## 2018-11-14 DIAGNOSIS — I87.2 CHRONIC VENOUS INSUFFICIENCY: ICD-10-CM

## 2018-11-14 PROCEDURE — 3075F SYST BP GE 130 - 139MM HG: CPT | Performed by: INTERNAL MEDICINE

## 2018-11-14 PROCEDURE — 99213 OFFICE O/P EST LOW 20 MIN: CPT | Performed by: INTERNAL MEDICINE

## 2018-11-14 PROCEDURE — 3079F DIAST BP 80-89 MM HG: CPT | Performed by: INTERNAL MEDICINE

## 2018-11-14 NOTE — PROGRESS NOTES
Assessment/Plan:    No problem-specific Assessment & Plan notes found for this encounter  Diagnoses and all orders for this visit:    Impaired fasting glucose  Her fasting blood sugar was pretty normal this time  Benign essential HTN  Blood pressure is normal 130/80 her kidney function liver functions electrolytes were normal  Chronic venous insufficiency    Chronic pain of right knee     she still have a lot of pain in the knee, mostly on the left than on the right side below x-rays of the both knee shows degenerative disease in the medial compartment  We will recommend her to see the orthopedic doctor for further evaluation and treatment, her TSH was normal    Subjective:   Complaining of bilateral knee pain more on the left than on the right side   Patient ID: Bernarda Rojo is a 65 y o  female  HPI    The following portions of the patient's history were reviewed and updated as appropriate: allergies, current medications, past family history, past medical history, past social history, past surgical history and problem list     Review of Systems   Constitutional: Negative  Respiratory: Negative for cough, chest tightness, shortness of breath and wheezing  Cardiovascular: Negative for chest pain, palpitations and leg swelling  Gastrointestinal: Negative for abdominal distention, abdominal pain, constipation, diarrhea, nausea and vomiting  Musculoskeletal: Positive for arthralgias  Skin: Negative  Neurological: Negative for dizziness, light-headedness and headaches  Hematological: Negative            Past Medical History:   Diagnosis Date    Allergic     Anemia     Enlarged thyroid     GERD (gastroesophageal reflux disease)     Hypertension     Obesity          Current Outpatient Prescriptions:     aspirin (ECOTRIN LOW STRENGTH) 81 mg EC tablet, Take 1 tablet (81 mg total) by mouth daily, Disp: 90 tablet, Rfl: 1    calcium citrate-vitamin D (CITRACAL+D) 315-200 MG-UNIT per tablet, Take 1 tablet by mouth daily, Disp: , Rfl:     Glucos-Chond-Hyal Ac-Ca Fructo (MOVE FREE JOINT HEALTH ADVANCE) TABS, Take 1 tablet by mouth daily, Disp: , Rfl:     hydrochlorothiazide (HYDRODIURIL) 25 mg tablet, Take 1 tablet (25 mg total) by mouth daily, Disp: 90 tablet, Rfl: 1    lisinopril (ZESTRIL) 20 mg tablet, Take 1 tablet (20 mg total) by mouth daily, Disp: 90 tablet, Rfl: 1    metoprolol succinate (TOPROL-XL) 100 mg 24 hr tablet, Take 1 tablet (100 mg total) by mouth daily, Disp: 90 tablet, Rfl: 1    Multiple Vitamins-Minerals (CENTRUM SILVER) tablet, Take 1 tablet by mouth daily, Disp: , Rfl:     Omega-3 Fatty Acids (FISH OIL) 1,000 mg, Take 2 capsules by mouth daily  , Disp: , Rfl:     Allergies   Allergen Reactions    Hydrocodone-Acetaminophen GI Intolerance    Morphine Vomiting    Percocet [Oxycodone-Acetaminophen] GI Intolerance       Social History   Past Surgical History:   Procedure Laterality Date    ELBOW SURGERY      [place]oken no surgery    ENDOMETRIAL ABLATION      WISDOM TOOTH EXTRACTION       Family History   Problem Relation Age of Onset    Hypertension Mother         benign essential     Heart attack Mother         recent     Hypertension Father         benign essential     Arthritis Father        Objective:  /80 (BP Location: Left arm, Patient Position: Sitting, Cuff Size: Adult)   Pulse 82   Temp 97 7 °F (36 5 °C) (Tympanic)   SpO2 97%        Physical Exam   Constitutional: She is oriented to person, place, and time  She appears well-developed and well-nourished  HENT:   Head: Normocephalic and atraumatic  Eyes: Pupils are equal, round, and reactive to light  Conjunctivae and EOM are normal    Neck: Normal range of motion  Neck supple  No JVD present  No thyromegaly present  Cardiovascular: Normal rate and regular rhythm  Pulmonary/Chest: Effort normal and breath sounds normal    Musculoskeletal: Normal range of motion  She exhibits no edema  Lymphadenopathy:     She has no cervical adenopathy  Neurological: She is alert and oriented to person, place, and time  Skin: Skin is warm and dry  Nursing note and vitals reviewed          Recent Results (from the past 672 hour(s))   Hepatitis C antibody    Collection Time: 10/29/18  9:05 AM   Result Value Ref Range    Hepatitis C Ab Non-reactive Non-reactive   CBC and differential    Collection Time: 10/29/18  9:05 AM   Result Value Ref Range    WBC 9 27 4 31 - 10 16 Thousand/uL    RBC 4 43 3 81 - 5 12 Million/uL    Hemoglobin 13 2 11 5 - 15 4 g/dL    Hematocrit 40 7 34 8 - 46 1 %    MCV 92 82 - 98 fL    MCH 29 8 26 8 - 34 3 pg    MCHC 32 4 31 4 - 37 4 g/dL    RDW 12 4 11 6 - 15 1 %    MPV 11 6 8 9 - 12 7 fL    Platelets 750 885 - 424 Thousands/uL    nRBC 0 /100 WBCs    Neutrophils Relative 65 43 - 75 %    Immat GRANS % 0 0 - 2 %    Lymphocytes Relative 22 14 - 44 %    Monocytes Relative 9 4 - 12 %    Eosinophils Relative 3 0 - 6 %    Basophils Relative 1 0 - 1 %    Neutrophils Absolute 6 09 1 85 - 7 62 Thousands/µL    Immature Grans Absolute 0 03 0 00 - 0 20 Thousand/uL    Lymphocytes Absolute 2 03 0 60 - 4 47 Thousands/µL    Monocytes Absolute 0 80 0 17 - 1 22 Thousand/µL    Eosinophils Absolute 0 23 0 00 - 0 61 Thousand/µL    Basophils Absolute 0 09 0 00 - 0 10 Thousands/µL   Comprehensive metabolic panel    Collection Time: 10/29/18  9:05 AM   Result Value Ref Range    Sodium 137 136 - 145 mmol/L    Potassium 3 7 3 5 - 5 3 mmol/L    Chloride 103 100 - 108 mmol/L    CO2 28 21 - 32 mmol/L    ANION GAP 6 4 - 13 mmol/L    BUN 17 5 - 25 mg/dL    Creatinine 0 64 0 60 - 1 30 mg/dL    Glucose 97 65 - 140 mg/dL    Calcium 8 9 8 3 - 10 1 mg/dL    AST 24 5 - 45 U/L    ALT 32 12 - 78 U/L    Alkaline Phosphatase 106 46 - 116 U/L    Total Protein 7 4 6 4 - 8 2 g/dL    Albumin 3 2 (L) 3 5 - 5 0 g/dL    Total Bilirubin 0 25 0 20 - 1 00 mg/dL    eGFR 100 ml/min/1 73sq m   TSH, 3rd generation with T4 reflex Collection Time: 10/29/18  9:05 AM   Result Value Ref Range    TSH 3RD GENERATON 0 555 0 358 - 3 740 uIU/mL

## 2018-11-26 ENCOUNTER — OFFICE VISIT (OUTPATIENT)
Dept: OBGYN CLINIC | Facility: OTHER | Age: 58
End: 2018-11-26
Payer: COMMERCIAL

## 2018-11-26 ENCOUNTER — APPOINTMENT (OUTPATIENT)
Dept: RADIOLOGY | Facility: OTHER | Age: 58
End: 2018-11-26
Payer: COMMERCIAL

## 2018-11-26 ENCOUNTER — HOSPITAL ENCOUNTER (OUTPATIENT)
Dept: RADIOLOGY | Facility: IMAGING CENTER | Age: 58
Discharge: HOME | End: 2018-11-26
Payer: COMMERCIAL

## 2018-11-26 VITALS
SYSTOLIC BLOOD PRESSURE: 139 MMHG | HEIGHT: 66 IN | HEART RATE: 70 BPM | WEIGHT: 268 LBS | DIASTOLIC BLOOD PRESSURE: 78 MMHG | BODY MASS INDEX: 43.07 KG/M2

## 2018-11-26 VITALS — BODY MASS INDEX: 43.07 KG/M2 | WEIGHT: 268 LBS | HEIGHT: 66 IN

## 2018-11-26 DIAGNOSIS — M25.561 CHRONIC PAIN OF RIGHT KNEE: ICD-10-CM

## 2018-11-26 DIAGNOSIS — M25.562 LEFT KNEE PAIN, UNSPECIFIED CHRONICITY: ICD-10-CM

## 2018-11-26 DIAGNOSIS — Z12.39 SCREENING FOR BREAST CANCER: ICD-10-CM

## 2018-11-26 DIAGNOSIS — M17.0 OSTEOARTHRITIS OF BOTH KNEES, UNSPECIFIED OSTEOARTHRITIS TYPE: ICD-10-CM

## 2018-11-26 DIAGNOSIS — M25.562 LEFT KNEE PAIN, UNSPECIFIED CHRONICITY: Primary | ICD-10-CM

## 2018-11-26 DIAGNOSIS — G89.29 CHRONIC PAIN OF RIGHT KNEE: ICD-10-CM

## 2018-11-26 PROCEDURE — 77067 SCR MAMMO BI INCL CAD: CPT

## 2018-11-26 PROCEDURE — 73562 X-RAY EXAM OF KNEE 3: CPT

## 2018-11-26 PROCEDURE — 99203 OFFICE O/P NEW LOW 30 MIN: CPT | Performed by: ORTHOPAEDIC SURGERY

## 2018-11-26 NOTE — PROGRESS NOTES
Assessment:       1  Left knee pain, unspecified chronicity    2  Chronic pain of right knee    3  Osteoarthritis of both knees, unspecified osteoarthritis type          Plan:        Patient's clinical exam and radiological findings are consistent with bilateral tricompartment osteoarthritis, discussed with patient and her   Discussed non-surgical and surgical treatment options including physical therapy, weight management, cortisone vs lubricating injection, and knee replacement  Referral given to physical therapy and home exercises provided today  Continue NSAIDs, ice PRN for pain  Follow up in 1-2 weeks for bilateral knee cortisone injection under ultrasound  Patient and  voiced understanding and agreement to plan; no further questions voiced  Subjective:     Patient ID: Wes James is a 63 y o  female  Chief Complaint:    HPI   Patient is a pleasant 64 yo F presenting with her  for initial evaluation of chronic bilateral knee pain, left>right  Patient notes chronic pain that intermittently flares up for the past 10+ years  Patient has a h/o left knee lateral patellar subluxation in 2005 s/p fall in which she received CSI at the time  Repeat left knee CSI in 2008 and right knee CSI in 2010  Pain has been well controlled with NSAIDs PRN until the last couple months  Notes constant 5/10 medial left knee pain which increases to 1/04 with certain exercises  3/10 right medial knee pain on average  Pain worse with going up and down stairs, getting up from seated position after extended sitting, bending down  Notes clicking and feeling tight at night  Denies numbness, tingling, locking, or catching  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  Social History     Occupational History    Not on file       Social History Main Topics    Smoking status: Former Smoker    Smokeless tobacco: Never Used    Alcohol use Yes      Comment: occasional  Drug use: No    Sexual activity: Not on file      Review of Systems   Constitutional: Negative for chills and fever  HENT: Negative for congestion, postnasal drip and sore throat  Respiratory: Negative for cough, choking, shortness of breath and wheezing  Cardiovascular: Negative for chest pain and palpitations  Gastrointestinal: Negative for abdominal pain, diarrhea, nausea and vomiting  Genitourinary: Negative for decreased urine volume and difficulty urinating  Musculoskeletal:        As above in HPI  Neurological: Negative for dizziness and light-headedness  Objective:     Ortho ExamPhysical Exam   Constitutional: She is oriented to person, place, and time  She appears well-developed and well-nourished  No distress  obese   HENT:   Head: Normocephalic and atraumatic  Eyes: Right eye exhibits no discharge  Left eye exhibits no discharge  Pulmonary/Chest: Effort normal  No respiratory distress  Neurological: She is alert and oriented to person, place, and time  No cranial nerve deficit  Skin: She is not diaphoretic  Psychiatric: She has a normal mood and affect  Her behavior is normal  Judgment and thought content normal    Nursing note and vitals reviewed        LEFT KNEE:  Erythema: no  Swelling: no  Increased Warmth: no  Tenderness: lateral joint line, medial joint line  Flexion: intact  Extension: intact  Patellar Apprehension: negative  Patellar Grind Asif's: positive  Lachman's: negative  Drawer: negative  Varus laxity: negative  Valgus laxity: negative  [facility]: equivocal - medial      RIGHT KNEE:  Erythema: no  Swelling: no  Increased Warmth: no  Tenderness: lateral joint line  Flexion: intact  Extension: intact  Patellar Apprehension: negative  Patellar Grind Asif's: negative  Lachman's: negative  Drawer: negative  Varus laxity: negative  Valgus laxity: negative  Masha: negative         I have personally reviewed pertinent films in PACS and my interpretation is as below:   11/26/18 left knee xray: no acute fracture or dislocation  Tricompartmental arthritic changes with bone spurs over lateral, medial, and patellar aspects  Past diagnostics:  10/29/18 bilateral knee ap standing: no acute fracture or dislocation  Degenerative arthritic changes bilaterally in medial and lateral compartments

## 2018-11-26 NOTE — PATIENT INSTRUCTIONS
Arthritis   WHAT YOU NEED TO KNOW:   Arthritis is a disease that causes inflammation in one or more joints  There are many types of arthritis, such as osteoarthritis, rheumatoid arthritis, and septic arthritis  Some types cause inflammation in the joints  Other types wear away the cartilage between joints  This makes the bones of the joint rub together when you move the joint  Your symptoms may be constant, or symptoms may come and go  Arthritis often gets worse over time and can cause permanent joint damage  DISCHARGE INSTRUCTIONS:   Return to the emergency department if:   · You have a fever and severe joint pain or swelling  · You cannot move the affected joint  · You have severe joint pain you cannot tolerate  Contact your healthcare provider if:   · Your pain or swelling does not get better with treatment  · You have questions or concerns about your condition or care  Medicines:   · Acetaminophen  decreases pain and fever  It is available without a doctor's order  Ask how much to take and how often to take it  Follow directions  Acetaminophen can cause liver damage if not taken correctly  · NSAIDs , such as ibuprofen, help decrease swelling, pain, and fever  This medicine is available with or without a doctor's order  NSAIDs can cause stomach bleeding or kidney problems in certain people  If you take blood thinner medicine, always ask your healthcare provider if NSAIDs are safe for you  Always read the medicine label and follow directions  · Steroids  reduce swelling and pain  · Prescription pain medicine  may be given  Do not wait until the pain is severe before you take your medicine  Ask your healthcare provider how to take this medicine safely  · Take your medicine as directed  Contact your healthcare provider if you think your medicine is not helping or if you have side effects  Tell him of her if you are allergic to any medicine   Keep a list of the medicines, vitamins, and herbs you take  Include the amounts, and when and why you take them  Bring the list or the pill bottles to follow-up visits  Carry your medicine list with you in case of an emergency  Follow up with your healthcare provider or rheumatologist as directed:  Write down your questions so you remember to ask them during your visits  Manage arthritis:   · Rest your painful joint so it can heal   Your healthcare provider may recommend crutches or a walker if the affected joint is in a leg  · Apply ice or heat to the joint  Both can help decrease swelling and pain  Ice may also help prevent tissue damage  Use an ice pack, or put crushed ice in a plastic bag  Cover it with a towel and place it on your joint for 15 to 20 minutes every hour or as directed  You can apply heat for 20 minutes every 2 hours  Heat treatment includes hot packs or heat lamps  · Elevate your joint  Elevation helps reduce swelling and pain  Raise your joint above the level of your heart as often as you can  Prop your painful joint on pillows to keep it above your heart comfortably  · Go to therapy as directed  A physical therapist can teach you exercises to improve flexibility and range of motion  You may also be shown non-weight-bearing exercises that are safe for your joints, such as swimming  Exercise can help keep your joints flexible and reduce pain  An occupational therapist can help you learn to do your daily activities when your joints are stiff or sore  · Maintain a healthy weight  Extra weight puts increased pressure on your joints  Ask your healthcare provider what you should weigh  If you need to lose weight, he can help you create a weight loss program  Weight loss can help reduce pain and increase your ability to do your activities  The amount of exercise you do may vary each day, depending on your symptoms  · Wear flat or low-heeled shoes    This will help decrease pain and reduce pressure on your ankle, knee, and hip joints  · Use support devices  You may be given splints to wear on your hands to help your joints rest and to decrease inflammation  While you sleep, use a pillow that is firm enough to support your neck and head  Support equipment:  The following may help you move and prevent falls:  · Orthotic shoes or insoles  help support your feet when you walk  · Crutches, a cane, or a walker  may help decrease your risk for falling  They also decrease stress on affected joints  · Devices to prevent falls  include raised toilet seats and bathtub bars to help you get up from sitting  Handrails can be placed in areas where you need balance and support  © 2017 [address] Information is for End User's use only and may not be sold, redistributed or otherwise used for commercial purposes  All illustrations and images included in CareNotes® are the copyrighted property of [employer]  or [employer]  The above information is an  only  It is not intended as medical advice for individual conditions or treatments  Talk to your doctor, nurse or pharmacist before following any medical regimen to see if it is safe and effective for you  Knee Exercises   AMBULATORY CARE:   What you need to know about knee exercises:  Knee exercises help strengthen the muscles around your knee  Strong muscles can help reduce pain and decrease your risk of future injury  Knee exercises also help you heal after an injury or surgery  · Start slow  These are beginning exercises  Ask your healthcare provider if you need to see a physical therapist for more advanced exercises  As you get stronger, you may be able to do more sets of each exercise or add weights  · Stop if you feel pain  It is normal to feel some discomfort at first  Regular exercise will help decrease your discomfort over time  · Do the exercises on both legs  Do this so both knees remain strong       · Warm up before you do knee exercises  Walk or ride a stationary bike for 5 or 10 minutes to warm your muscles  How to perform knee stretches safely:  Always stretch before you do strengthening exercises  Do these stretching exercises again after you do the strengthening exercises  Do these stretches 4 or 5 days a week, or as directed  · Standing calf stretch: Face a wall and place both palms flat on the wall, or hold the back of a chair for balance  Keep a slight bend in your knees  Take a big step backward with one leg  Keep your other leg directly under you  Keep both heels flat and press your hips forward  Hold the stretch for 30 seconds, and then relax for 30 seconds  Switch legs  Repeat 2 or 3 times on each leg  · Standing quadriceps stretch:  Stand and place one hand against a wall or hold the back of a chair for balance  With your weight on one leg, bend your other leg and grab your ankle  Bring your heel toward your buttocks  Hold the stretch for 30 to 60 seconds  Switch legs  Repeat 2 or 3 times on each leg  · Sitting hamstring stretch:  Sit with both legs straight in front of you  Do not point or flex your toes  Place your palms on the floor and slide your hands forward until you feel the stretch  Do not round your back  Hold the stretch for 30 seconds  Repeat 2 or 3 times  How to perform knee strengthening exercises safely:  Do these exercises 4 or 5 days a week, or as directed  · Standing half squats:  Stand with your feet shoulder-width apart  Lean your back against a wall or hold the back of a chair for balance, if needed  Slowly sit down about 10 inches, as if you are going to sit in a chair  Your body weight should be mostly over your heels  Hold the squat for 5 seconds, then rise to a standing position  Do 3 sets of 10 squats to strengthen your buttocks and thighs  · Standing hamstring curls:   Face a wall and place both palms flat on the wall, or hold the back of a chair for balance  With your weight on one leg, lift your other foot as close to your buttocks as you can  Hold for 5 seconds and then lower your leg  Do 2 sets of 10 curls on each leg  This exercise strengthens the muscles in the back of your thigh  · Standing calf raises:  Face a wall and place both palms flat on the wall, or hold the back of a chair for balance  Stand up straight, and do not lean  Place all your weight on one leg by lifting the other foot off the floor  Raise the heel of the foot that is on the floor as high as you can and then lower it  Do 2 sets of 10 calf raises on each leg to strengthen your calf muscles  · Straight leg lifts:  Lie on your stomach with straight legs  Fold your arms in front of you and rest your head in your arms  Tighten your leg muscles and raise one leg as high as you can  Hold for 5 seconds, then lower your leg  Do 2 sets of 10 lifts on each leg to strengthen your buttocks  · Sitting leg lifts:  Sit in a chair  Slowly straighten and raise one leg  Squeeze your thigh muscles and hold for 5 seconds  Relax and return your foot to the floor  Do 2 sets of 10 lifts on each leg  This helps strengthen the muscles in the front of your thigh  Contact your healthcare provider if:   · You have new pain or your pain becomes worse  · You have questions or concerns about your condition or care  © 2017 [address] Information is for End User's use only and may not be sold, redistributed or otherwise used for commercial purposes  All illustrations and images included in CareNotes® are the copyrighted property of [employer] Inc  or [employer]  The above information is an  only  It is not intended as medical advice for individual conditions or treatments  Talk to your doctor, nurse or pharmacist before following any medical regimen to see if it is safe and effective for you

## 2018-12-18 ENCOUNTER — OFFICE VISIT (OUTPATIENT)
Dept: OBGYN CLINIC | Facility: OTHER | Age: 58
End: 2018-12-18
Payer: COMMERCIAL

## 2018-12-18 VITALS
DIASTOLIC BLOOD PRESSURE: 104 MMHG | WEIGHT: 268 LBS | BODY MASS INDEX: 42.06 KG/M2 | HEIGHT: 67 IN | SYSTOLIC BLOOD PRESSURE: 166 MMHG | HEART RATE: 69 BPM

## 2018-12-18 DIAGNOSIS — M17.0 OSTEOARTHRITIS OF BOTH KNEES, UNSPECIFIED OSTEOARTHRITIS TYPE: Primary | ICD-10-CM

## 2018-12-18 PROCEDURE — 20611 DRAIN/INJ JOINT/BURSA W/US: CPT | Performed by: ORTHOPAEDIC SURGERY

## 2018-12-18 PROCEDURE — 99214 OFFICE O/P EST MOD 30 MIN: CPT | Performed by: ORTHOPAEDIC SURGERY

## 2018-12-18 RX ORDER — TRIAMCINOLONE ACETONIDE 40 MG/ML
40 INJECTION, SUSPENSION INTRA-ARTICULAR; INTRAMUSCULAR
Status: COMPLETED | OUTPATIENT
Start: 2018-12-18 | End: 2018-12-18

## 2018-12-18 RX ORDER — LIDOCAINE HYDROCHLORIDE 10 MG/ML
4 INJECTION, SOLUTION INFILTRATION; PERINEURAL
Status: COMPLETED | OUTPATIENT
Start: 2018-12-18 | End: 2018-12-18

## 2018-12-18 RX ADMIN — LIDOCAINE HYDROCHLORIDE 4 ML: 10 INJECTION, SOLUTION INFILTRATION; PERINEURAL at 15:13

## 2018-12-18 RX ADMIN — TRIAMCINOLONE ACETONIDE 40 MG: 40 INJECTION, SUSPENSION INTRA-ARTICULAR; INTRAMUSCULAR at 15:14

## 2018-12-18 RX ADMIN — LIDOCAINE HYDROCHLORIDE 4 ML: 10 INJECTION, SOLUTION INFILTRATION; PERINEURAL at 15:14

## 2018-12-18 RX ADMIN — TRIAMCINOLONE ACETONIDE 40 MG: 40 INJECTION, SUSPENSION INTRA-ARTICULAR; INTRAMUSCULAR at 15:13

## 2018-12-18 NOTE — PROGRESS NOTES
Large joint arthrocentesis  Date/Time: 12/18/2018 3:13 PM  Consent given by: patient  Site marked: site marked  Timeout: Immediately prior to procedure a time out was called to verify the correct patient, procedure, equipment, support staff and site/side marked as required   Supporting Documentation  Indications: pain   Procedure Details  Location: knee - R knee  Preparation: Patient was prepped and draped in the usual sterile fashion  Needle size: 25 G  Ultrasound guidance: yes  Approach: anterolateral  Medications administered: 4 mL lidocaine 1 %; 40 mg triamcinolone acetonide 40 mg/mL    Aspirate amount: 30 mL  Aspirate: yellow and clear  Patient tolerance: patient tolerated the procedure well with no immediate complications  Dressing:  Sterile dressing applied  Large joint arthrocentesis  Date/Time: 12/18/2018 3:14 PM  Consent given by: patient  Site marked: site marked  Timeout: Immediately prior to procedure a time out was called to verify the correct patient, procedure, equipment, support staff and site/side marked as required   Supporting Documentation  Indications: pain   Procedure Details  Location: knee - L knee  Preparation: Patient was prepped and draped in the usual sterile fashion  Needle size: 25 G  Ultrasound guidance: yes  Approach: anterolateral  Medications administered: 4 mL lidocaine 1 %; 40 mg triamcinolone acetonide 40 mg/mL    Aspirate amount: 10 mL  Aspirate: yellow and clear  Patient tolerance: patient tolerated the procedure well with no immediate complications  Dressing:  Sterile dressing applied    '

## 2018-12-20 ENCOUNTER — OFFICE VISIT (OUTPATIENT)
Dept: INTERNAL MEDICINE CLINIC | Facility: CLINIC | Age: 58
End: 2018-12-20
Payer: COMMERCIAL

## 2018-12-20 ENCOUNTER — TELEPHONE (OUTPATIENT)
Dept: INTERNAL MEDICINE CLINIC | Facility: CLINIC | Age: 58
End: 2018-12-20

## 2018-12-20 VITALS
WEIGHT: 267.6 LBS | DIASTOLIC BLOOD PRESSURE: 100 MMHG | OXYGEN SATURATION: 97 % | SYSTOLIC BLOOD PRESSURE: 150 MMHG | TEMPERATURE: 98.2 F | HEIGHT: 65 IN | BODY MASS INDEX: 44.58 KG/M2 | HEART RATE: 96 BPM

## 2018-12-20 DIAGNOSIS — I10 BENIGN ESSENTIAL HTN: Primary | ICD-10-CM

## 2018-12-20 DIAGNOSIS — I10 ESSENTIAL HYPERTENSION, BENIGN: ICD-10-CM

## 2018-12-20 DIAGNOSIS — I10 BENIGN ESSENTIAL HYPERTENSION: ICD-10-CM

## 2018-12-20 PROCEDURE — 3008F BODY MASS INDEX DOCD: CPT | Performed by: INTERNAL MEDICINE

## 2018-12-20 PROCEDURE — 99213 OFFICE O/P EST LOW 20 MIN: CPT | Performed by: INTERNAL MEDICINE

## 2018-12-20 PROCEDURE — 1036F TOBACCO NON-USER: CPT | Performed by: INTERNAL MEDICINE

## 2018-12-20 RX ORDER — LISINOPRIL 20 MG/1
40 TABLET ORAL DAILY
Qty: 180 TABLET | Refills: 0 | Status: SHIPPED | OUTPATIENT
Start: 2018-12-20 | End: 2019-01-02 | Stop reason: SDUPTHER

## 2018-12-20 RX ORDER — HYDROCHLOROTHIAZIDE 25 MG/1
50 TABLET ORAL DAILY
Qty: 180 TABLET | Refills: 0 | Status: SHIPPED | OUTPATIENT
Start: 2018-12-20 | End: 2019-04-04 | Stop reason: SDUPTHER

## 2018-12-20 NOTE — TELEPHONE ENCOUNTER
Reviewed Dr Cristobal Ravi noted on 11/14  BP initial at appt was 166/104  Pt reports typically runs 130/80s in his note  Noted elevations below, however pt has been having pain that could cause elevation  Will forward to Dr Cristobal Ravi, but may be best to increase her lisinopril to 40mg  Recent renal fxn stable

## 2018-12-20 NOTE — TELEPHONE ENCOUNTER
She normally sees dr Radha Fan:   Was in recently did not want to make appt   She also just   Had cortisone shots in her knees on  Tuesday afternoon with dr You Glasgow  Since then her bp is elevated she was told to call her pcp   These readings are form today- she stayed home from work was shaky this morning she will have work later   2am 154/89   450 am 162/101 after she had been sl;eeping   510am 173/108   No temperature   Took bp med at 5 am   905am 148/97     # [id number]

## 2018-12-20 NOTE — PROGRESS NOTES
Assessment/Plan:    Benign essential hypertension  - worsening control likely secondary to steroid injection  - will increase her lisinopril from 20 mg to 40 mg daily  - patient has been counseled to monitor her blood pressure at least twice daily and keep a blood pressure log  - if her blood pressure remains above 130/80 after 1 week patient has been counseled to increase her hydrochlorothiazide from 25 mg 50 mg daily  - follow-up in 1 month     Diagnoses and all orders for this visit:    Benign essential HTN    Benign essential hypertension  -     lisinopril (ZESTRIL) 20 mg tablet; Take 2 tablets (40 mg total) by mouth daily    Essential hypertension, benign  -     hydrochlorothiazide (HYDRODIURIL) 25 mg tablet; Take 2 tablets (50 mg total) by mouth daily            Subjective:      Patient ID: Tae Seuplveda is a 63 y o  female  HPI  Patient presents with complains of elevated blood pressures since she got her cortisol knee injections few days ago  She has been checking her blood pressure at home because she was warned that the cortisone injections will likely make her blood pressure increase and she got elevated values with a high is being 482 systolic and 837 diastolic  She admits to occasional headache but denies chest pain, shortness of breath, palpitations, cough, nausea, vomiting, abdominal pain, diarrhea, constipation, fever, chills, leg swelling  The following portions of the patient's history were reviewed and updated as appropriate: allergies, current medications, past family history, past medical history, past social history, past surgical history and problem list     Review of Systems   Constitutional: Negative for activity change, chills, fatigue, fever and unexpected weight change  HENT: Negative for ear pain, postnasal drip, rhinorrhea, sinus pressure and sore throat  Eyes: Negative for pain     Respiratory: Negative for cough, choking, chest tightness, shortness of breath and wheezing  Cardiovascular: Negative for chest pain, palpitations and leg swelling  Gastrointestinal: Negative for abdominal pain, constipation, diarrhea, nausea and vomiting  Genitourinary: Negative for dysuria and hematuria  Musculoskeletal: Positive for arthralgias  Negative for back pain, gait problem, joint swelling, myalgias and neck stiffness  Skin: Negative for pallor and rash  Neurological: Positive for headaches  Negative for dizziness, tremors, seizures, syncope and light-headedness  Hematological: Negative for adenopathy  Psychiatric/Behavioral: Negative for behavioral problems           Past Medical History:   Diagnosis Date    Allergic     Anemia     Enlarged thyroid     GERD (gastroesophageal reflux disease)     Hypertension     Obesity     Osteoarthritis of both knees 11/26/2018         Current Outpatient Prescriptions:     aspirin (ECOTRIN LOW STRENGTH) 81 mg EC tablet, Take 1 tablet (81 mg total) by mouth daily, Disp: 90 tablet, Rfl: 1    calcium citrate-vitamin D (CITRACAL+D) 315-200 MG-UNIT per tablet, Take 1 tablet by mouth daily, Disp: , Rfl:     Glucos-Chond-Hyal Ac-Ca Fructo (MOVE FREE JOINT HEALTH ADVANCE) TABS, Take 1 tablet by mouth daily, Disp: , Rfl:     hydrochlorothiazide (HYDRODIURIL) 25 mg tablet, Take 2 tablets (50 mg total) by mouth daily, Disp: 180 tablet, Rfl: 0    lisinopril (ZESTRIL) 20 mg tablet, Take 2 tablets (40 mg total) by mouth daily, Disp: 180 tablet, Rfl: 0    metoprolol succinate (TOPROL-XL) 100 mg 24 hr tablet, Take 1 tablet (100 mg total) by mouth daily, Disp: 90 tablet, Rfl: 1    Multiple Vitamins-Minerals (CENTRUM SILVER) tablet, Take 1 tablet by mouth daily, Disp: , Rfl:     Omega-3 Fatty Acids (FISH OIL) 1,000 mg, Take 2 capsules by mouth daily  , Disp: , Rfl:     Allergies   Allergen Reactions    Hydrocodone-Acetaminophen GI Intolerance    Morphine Vomiting    Percocet [Oxycodone-Acetaminophen] GI Intolerance       Social History Past Surgical History:   Procedure Laterality Date    ELBOW SURGERY      Boken no surgery    ENDOMETRIAL ABLATION      WISDOM TOOTH EXTRACTION       Family History   Problem Relation Age of Onset    Hypertension Mother         benign essential     Heart attack Mother         recent    Roby Daubs Hypertension Father         benign essential     Arthritis Father     Breast cancer Paternal Grandmother        Objective:  /100 (BP Location: Left arm, Patient Position: Sitting, Cuff Size: Large)   Pulse 96   Temp 98 2 °F (36 8 °C) (Oral)   Ht 5' 5" (1 651 m)   Wt 121 kg (267 lb 9 6 oz)   SpO2 97%   BMI 44 53 kg/m²        Physical Exam   Constitutional: She is oriented to person, place, and time  She appears well-developed and well-nourished  No distress  Obese   HENT:   Head: Normocephalic and atraumatic  Right Ear: External ear normal    Left Ear: External ear normal    Nose: Nose normal    Mouth/Throat: Oropharynx is clear and moist  No oropharyngeal exudate  Eyes: Pupils are equal, round, and reactive to light  Conjunctivae and EOM are normal  Right eye exhibits no discharge  Left eye exhibits no discharge  No scleral icterus  Neck: Normal range of motion  Neck supple  No JVD present  No tracheal deviation present  No thyromegaly present  Cardiovascular: Normal rate, regular rhythm, normal heart sounds and intact distal pulses  Exam reveals no gallop and no friction rub  No murmur heard  Pulmonary/Chest: Effort normal and breath sounds normal  No respiratory distress  She has no wheezes  She has no rales  She exhibits no tenderness  Abdominal: Soft  Bowel sounds are normal  She exhibits no distension and no mass  There is no tenderness  There is no rebound and no guarding  Musculoskeletal: Normal range of motion  She exhibits edema (Trace pitting pedal edema bilaterally up to lower shins)  She exhibits no tenderness or deformity  Lymphadenopathy:     She has no cervical adenopathy  Neurological: She is alert and oriented to person, place, and time  She has normal reflexes  No cranial nerve deficit  She exhibits normal muscle tone  Coordination normal    Skin: Skin is warm and dry  No rash noted  She is not diaphoretic  No erythema  No pallor  Psychiatric: She has a normal mood and affect   Her behavior is normal

## 2018-12-21 NOTE — TELEPHONE ENCOUNTER
Patient was seen in the office by Dr Mikey Ryan on 12/20/18 @ [id number]  The Lisinopril was increased  See Dr Valderrama Mast note

## 2019-01-02 DIAGNOSIS — I10 BENIGN ESSENTIAL HYPERTENSION: ICD-10-CM

## 2019-01-02 DIAGNOSIS — I10 ESSENTIAL HYPERTENSION, BENIGN: ICD-10-CM

## 2019-01-02 RX ORDER — LISINOPRIL 20 MG/1
40 TABLET ORAL DAILY
Qty: 180 TABLET | Refills: 1 | Status: SHIPPED | OUTPATIENT
Start: 2019-01-02 | End: 2019-04-18

## 2019-01-02 RX ORDER — METOPROLOL SUCCINATE 100 MG/1
100 TABLET, EXTENDED RELEASE ORAL DAILY
Qty: 90 TABLET | Refills: 1 | Status: SHIPPED | OUTPATIENT
Start: 2019-01-02 | End: 2019-08-15 | Stop reason: SDUPTHER

## 2019-01-24 ENCOUNTER — OFFICE VISIT (OUTPATIENT)
Dept: INTERNAL MEDICINE CLINIC | Facility: CLINIC | Age: 59
End: 2019-01-24
Payer: COMMERCIAL

## 2019-01-24 VITALS
WEIGHT: 262.4 LBS | OXYGEN SATURATION: 98 % | BODY MASS INDEX: 42.17 KG/M2 | HEIGHT: 66 IN | TEMPERATURE: 98.5 F | SYSTOLIC BLOOD PRESSURE: 140 MMHG | HEART RATE: 72 BPM | DIASTOLIC BLOOD PRESSURE: 88 MMHG

## 2019-01-24 DIAGNOSIS — I10 BENIGN ESSENTIAL HTN: ICD-10-CM

## 2019-01-24 DIAGNOSIS — J06.9 VIRAL UPPER RESPIRATORY TRACT INFECTION: Primary | ICD-10-CM

## 2019-01-24 PROCEDURE — 1036F TOBACCO NON-USER: CPT | Performed by: NURSE PRACTITIONER

## 2019-01-24 PROCEDURE — 99214 OFFICE O/P EST MOD 30 MIN: CPT | Performed by: NURSE PRACTITIONER

## 2019-01-24 PROCEDURE — 3008F BODY MASS INDEX DOCD: CPT | Performed by: NURSE PRACTITIONER

## 2019-01-24 RX ORDER — FLUTICASONE PROPIONATE 50 MCG
1 SPRAY, SUSPENSION (ML) NASAL DAILY
Qty: 1 BOTTLE | Refills: 0 | Status: SHIPPED | OUTPATIENT
Start: 2019-01-24 | End: 2019-05-29 | Stop reason: ALTCHOICE

## 2019-01-24 RX ORDER — BENZONATATE 200 MG/1
200 CAPSULE ORAL 3 TIMES DAILY PRN
Qty: 20 CAPSULE | Refills: 0 | Status: SHIPPED | OUTPATIENT
Start: 2019-01-24 | End: 2019-05-29 | Stop reason: ALTCHOICE

## 2019-01-24 RX ORDER — PROMETHAZINE HYDROCHLORIDE AND CODEINE PHOSPHATE 6.25; 1 MG/5ML; MG/5ML
5 SYRUP ORAL EVERY 4 HOURS PRN
Qty: 120 ML | Refills: 0 | Status: SHIPPED | OUTPATIENT
Start: 2019-01-24 | End: 2019-05-29 | Stop reason: ALTCHOICE

## 2019-01-24 NOTE — PROGRESS NOTES
MEDICAL STUDENT  Inpatient Progress Note for TRAINING ONLY  Not Part of Legal Medical Record       Progress Note - Bernarda Rojo 63 y o  female MRN: [id number]    Unit/Bed#:  Encounter: [id number]      Assessment:  ***    Plan:  ***    Subjective:   ***    Objective:     Vitals: Blood pressure 140/88, pulse 72, temperature 98 5 °F (36 9 °C), temperature source Oral, height 5' 5 5" (1 664 m), weight 119 kg (262 lb 6 4 oz), SpO2 98 %  ,Body mass index is 43 kg/m²      [unfilled]    Physical Exam: {Exam, Complete:39878}     Invasive Devices          No matching active lines, drains, or airways          Lab, Imaging and other studies: {Results Review Statement:70667}  VTE Pharmacologic Prophylaxis: {Pharmacologic VTE Prophylaxis:469471746}  VTE Mechanical Prophylaxis: {Mechanical VTE Prophylaxis:21210}

## 2019-01-24 NOTE — PROGRESS NOTES
Assessment/Plan:  1  Cough, likely Viral URI   - Take  Benzonatate 200 mg TID prn for cough  Pt voiced understanding and compliance     -Fluticasone 50 mcg/act: place 2 sprays in each nostril daily while experiencing cough or post nasal drip in order to prevent inflammation in sinuses and ears and decrease post nasal drip    - Take 5 mL of promethazine-codeine 6 25-10mg/5mL syrup at night before bed while experiencing cough in order to suppress cough at night and aid in sleep while experiencing cough  Pt voiced understanding of direction of how to take medication  Pt has had previous episode of vomiting with hydrocodone but no anaphylaxis  Pt advised to stop taking phenergan with codeine if experiencing nausea or vomiting     -Pt counseled about importance to stay hydrated and drink a lot of water  Pt voiced understanding and compliance     -Pt advised to take Mucinex OTC as directed on box in order to aid in decongestant  Pt advised not to by Mucinex DM due to her HTN  Pt voiced understanding and compliance     -Pt to follow-up with Dr Martha Parker in 2 weeks for her regular scheduled follow-up  Pt advised to call office if symptoms still worsening in 1 week  Pt advised to go to ED if experiencing chest pain, shortness of breath, light headedness, or dizziness  2  HTN    -Pt counseled about how to take hypertension medication, as she had questions following her cortisone injections in her knee as she feels that caused her blood pressure to become elevated  Pt advised to take 1 tablets of 20 mg lisinopril in morning and one tablet in the evening, 1  25mg tablet of HCTZ in morning, and 1 100mg tablet of metoprolol succinate in morning as her BP was elevated at 140/88 in office today and she has only been taking 1 20 mg tablet of lisinopril at home "over the past couple of days"    -Pt advised to continue taking blood pressure at home regularly   Pt advised to call office with any low blood pressure readings with symptoms or high blood pressure readings with symptoms  Pt encouraged to call office with any questions  Pt voiced understanding and compliance     -Pt has follow-up appointment with Dr Radha Head in 2 weeks    No problem-specific Assessment & Plan notes found for this encounter  Diagnoses and all orders for this visit:    Viral upper respiratory tract infection  -     fluticasone (FLONASE) 50 mcg/act nasal spray; 1 spray into each nostril daily  -     benzonatate (TESSALON) 200 MG capsule; Take 1 capsule (200 mg total) by mouth 3 (three) times a day as needed for cough  -     promethazine-codeine (PHENERGAN WITH CODEINE) 6 25-10 mg/5 mL syrup; Take 5 mL by mouth every 4 (four) hours as needed for cough    Benign essential HTN          Subjective:      Patient ID: Asia Vazquez is a 63 y o  female  R F  Is a 64 yo CF with a PMH significant of HTN coming into the office today with complaints of "tickly cough" x 5 days  She states that she had a cold about 2 weeks ago, thought she was improving, and now she is starting to feel worse again since Saturday  Her her stomach has been upset "here and there" since feeling sick on Saturday  She has two episodes of watery stool one yesterday and one today  Sweets and milk seem to make her cough worse  She has been trying "[employer]," Eden Soother Cough Drops, Mucinex DM (a couple of doses, though it was helping a little), Delsym (1/2 cup one night, seemed to help but "it was nasty and I had to force it down", and Robotussin DM (a couple of times)  She has not "tried anything consistently" but nothing made her feel "extrememly better " She also complains of a "scratchy" left eye yesterday morning, went away, came back this morning, but is feeling better now  She is a , and she says "you have no idea how many of these kids are sick " She denies denies using tobacco products and illicit drug use   She uses alcohol "once a month " Denies recent travel  She received a flu shot this year  She has been eating and drinking "okay"; when she was experience the slight abdominal pain, she was eating a blander diet and not as much  She coughs worse at night  She complains of "rib pain from coughing "      Cough   This is a recurrent problem  The current episode started in the past 7 days  The problem has been gradually worsening  The problem occurs constantly  Cough characteristics: "a little bit of phlegm but not enough to cough up" Associated symptoms include nasal congestion, postnasal drip, rhinorrhea and wheezing (when laying flat, hears whistling)  Pertinent negatives include no chest pain, chills, ear congestion, ear pain, fever, headaches, heartburn, hemoptysis, myalgias, rash, sore throat or shortness of breath  She has tried body position changes for the symptoms  There is no history of asthma, COPD, environmental allergies or pneumonia  The following portions of the patient's history were reviewed and updated as appropriate: allergies, current medications, past family history, past medical history, past social history, past surgical history and problem list     Review of Systems   Constitutional: Negative for activity change, appetite change, chills, diaphoresis and fever  HENT: Positive for congestion, postnasal drip and rhinorrhea  Negative for ear discharge, ear pain, sinus pain, sinus pressure, sore throat and trouble swallowing  Eyes: Positive for itching (left eye periodically over the past two days)  Negative for photophobia, pain, discharge and visual disturbance  Respiratory: Positive for cough and wheezing (when laying flat, hears whistling)  Negative for hemoptysis, chest tightness and shortness of breath  Cardiovascular: Negative for chest pain, palpitations and leg swelling  Gastrointestinal: Positive for diarrhea (2 episodes) and nausea ("once in a while" )   Negative for abdominal pain, blood in stool, constipation, heartburn and vomiting  Endocrine: Negative for polydipsia, polyphagia and polyuria  Genitourinary: Negative for difficulty urinating, dysuria, frequency, hematuria and urgency  Musculoskeletal: Positive for back pain (from sleeping on recliner last night)  Negative for arthralgias, myalgias and neck pain  Skin: Negative for rash and wound  Allergic/Immunologic: Negative for environmental allergies  Neurological: Negative for dizziness, seizures, syncope, weakness, light-headedness, numbness and headaches  Psychiatric/Behavioral: Negative for dysphoric mood and suicidal ideas  The patient is not nervous/anxious  Objective:      /88 (BP Location: Right arm, Patient Position: Sitting, Cuff Size: Large)   Pulse 72   Temp 98 5 °F (36 9 °C) (Oral)   Ht 5' 5 5" (1 664 m)   Wt 119 kg (262 lb 6 4 oz)   SpO2 98%   BMI 43 00 kg/m²          Physical Exam   Constitutional: She is oriented to person, place, and time  She appears well-developed and well-nourished  No distress  HENT:   Head: Normocephalic and atraumatic  Right Ear: External ear normal  Tympanic membrane is bulging  Tympanic membrane is not erythematous  A middle ear effusion is present  Left Ear: External ear normal  Tympanic membrane is bulging  Tympanic membrane is not erythematous  A middle ear effusion is present  Nose: Mucosal edema and rhinorrhea present  Right sinus exhibits no maxillary sinus tenderness and no frontal sinus tenderness  Left sinus exhibits no maxillary sinus tenderness and no frontal sinus tenderness  Mouth/Throat: Mucous membranes are pale and dry  Posterior oropharyngeal erythema present  No oropharyngeal exudate  Eyes: Pupils are equal, round, and reactive to light  Conjunctivae and EOM are normal  Right eye exhibits no discharge  Left eye exhibits no discharge  Neck: Normal range of motion  Neck supple  No thyromegaly present     Cardiovascular: Normal rate, regular rhythm, normal heart sounds and intact distal pulses  Exam reveals no gallop and no friction rub  No murmur heard  Pulmonary/Chest: Effort normal and breath sounds normal  No stridor  No respiratory distress  She has no decreased breath sounds  She has no wheezes  She has no rhonchi  She has no rales  Abdominal: Soft  Bowel sounds are normal  She exhibits no distension  There is no tenderness  Lymphadenopathy:     She has no cervical adenopathy  Neurological: She is alert and oriented to person, place, and time  Skin: Skin is warm and dry  No rash noted  She is not diaphoretic  No erythema  Psychiatric: She has a normal mood and affect   Her behavior is normal  Judgment and thought content normal

## 2019-04-03 ENCOUNTER — TELEPHONE (OUTPATIENT)
Dept: INTERNAL MEDICINE CLINIC | Facility: CLINIC | Age: 59
End: 2019-04-03

## 2019-04-04 DIAGNOSIS — I10 ESSENTIAL HYPERTENSION, BENIGN: ICD-10-CM

## 2019-04-05 RX ORDER — ASPIRIN 81 MG/1
81 TABLET ORAL DAILY
Qty: 90 TABLET | Refills: 1 | Status: SHIPPED | OUTPATIENT
Start: 2019-04-05 | End: 2019-10-21 | Stop reason: SDUPTHER

## 2019-04-05 RX ORDER — HYDROCHLOROTHIAZIDE 25 MG/1
25 TABLET ORAL 2 TIMES DAILY
Qty: 180 TABLET | Refills: 1 | Status: SHIPPED | OUTPATIENT
Start: 2019-04-05 | End: 2019-04-18 | Stop reason: SDUPTHER

## 2019-04-18 ENCOUNTER — OFFICE VISIT (OUTPATIENT)
Dept: INTERNAL MEDICINE CLINIC | Facility: CLINIC | Age: 59
End: 2019-04-18
Payer: COMMERCIAL

## 2019-04-18 VITALS
TEMPERATURE: 98 F | HEART RATE: 78 BPM | HEIGHT: 66 IN | OXYGEN SATURATION: 96 % | SYSTOLIC BLOOD PRESSURE: 136 MMHG | WEIGHT: 267.2 LBS | BODY MASS INDEX: 42.94 KG/M2 | DIASTOLIC BLOOD PRESSURE: 88 MMHG

## 2019-04-18 DIAGNOSIS — I10 ESSENTIAL HYPERTENSION, BENIGN: ICD-10-CM

## 2019-04-18 DIAGNOSIS — M25.561 CHRONIC PAIN OF RIGHT KNEE: ICD-10-CM

## 2019-04-18 DIAGNOSIS — G89.29 CHRONIC PAIN OF RIGHT KNEE: ICD-10-CM

## 2019-04-18 PROCEDURE — 99214 OFFICE O/P EST MOD 30 MIN: CPT | Performed by: INTERNAL MEDICINE

## 2019-04-18 RX ORDER — HYDROCHLOROTHIAZIDE 25 MG/1
25 TABLET ORAL 2 TIMES DAILY
Qty: 180 TABLET | Refills: 1 | Status: SHIPPED | OUTPATIENT
Start: 2019-04-18 | End: 2020-02-12 | Stop reason: SDUPTHER

## 2019-04-18 RX ORDER — LOSARTAN POTASSIUM 100 MG/1
100 TABLET ORAL DAILY
Qty: 90 TABLET | Refills: 3 | Status: SHIPPED | OUTPATIENT
Start: 2019-04-18 | End: 2020-04-07

## 2019-05-29 ENCOUNTER — OFFICE VISIT (OUTPATIENT)
Dept: INTERNAL MEDICINE CLINIC | Facility: CLINIC | Age: 59
End: 2019-05-29
Payer: COMMERCIAL

## 2019-05-29 VITALS
OXYGEN SATURATION: 97 % | TEMPERATURE: 101.4 F | BODY MASS INDEX: 40.49 KG/M2 | HEART RATE: 100 BPM | DIASTOLIC BLOOD PRESSURE: 74 MMHG | WEIGHT: 258 LBS | HEIGHT: 67 IN | SYSTOLIC BLOOD PRESSURE: 122 MMHG

## 2019-05-29 DIAGNOSIS — R11.2 NAUSEA AND VOMITING, INTRACTABILITY OF VOMITING NOT SPECIFIED, UNSPECIFIED VOMITING TYPE: ICD-10-CM

## 2019-05-29 DIAGNOSIS — B34.9 ACUTE VIRAL SYNDROME: ICD-10-CM

## 2019-05-29 DIAGNOSIS — I10 BENIGN ESSENTIAL HTN: ICD-10-CM

## 2019-05-29 DIAGNOSIS — R68.89 FLU-LIKE SYMPTOMS: Primary | ICD-10-CM

## 2019-05-29 PROCEDURE — 3078F DIAST BP <80 MM HG: CPT | Performed by: INTERNAL MEDICINE

## 2019-05-29 PROCEDURE — 99214 OFFICE O/P EST MOD 30 MIN: CPT | Performed by: INTERNAL MEDICINE

## 2019-05-29 PROCEDURE — 3074F SYST BP LT 130 MM HG: CPT | Performed by: INTERNAL MEDICINE

## 2019-05-29 PROCEDURE — 3008F BODY MASS INDEX DOCD: CPT | Performed by: INTERNAL MEDICINE

## 2019-05-29 RX ORDER — ONDANSETRON 4 MG/1
4 TABLET, FILM COATED ORAL EVERY 8 HOURS PRN
Qty: 20 TABLET | Refills: 0 | Status: SHIPPED | OUTPATIENT
Start: 2019-05-29 | End: 2019-09-27

## 2019-05-29 RX ORDER — OSELTAMIVIR PHOSPHATE 75 MG/1
75 CAPSULE ORAL 2 TIMES DAILY
Qty: 10 CAPSULE | Refills: 0 | Status: SHIPPED | OUTPATIENT
Start: 2019-05-29 | End: 2019-06-03

## 2019-06-11 ENCOUNTER — CONSULT (OUTPATIENT)
Dept: BARIATRICS | Facility: CLINIC | Age: 59
End: 2019-06-11
Payer: COMMERCIAL

## 2019-06-11 VITALS
HEART RATE: 82 BPM | SYSTOLIC BLOOD PRESSURE: 120 MMHG | TEMPERATURE: 97.8 F | RESPIRATION RATE: 18 BRPM | BODY MASS INDEX: 41.51 KG/M2 | HEIGHT: 66 IN | WEIGHT: 258.3 LBS | DIASTOLIC BLOOD PRESSURE: 88 MMHG

## 2019-06-11 DIAGNOSIS — M17.0 OSTEOARTHRITIS OF BOTH KNEES, UNSPECIFIED OSTEOARTHRITIS TYPE: ICD-10-CM

## 2019-06-11 DIAGNOSIS — E78.1 HYPERTRIGLYCERIDEMIA: ICD-10-CM

## 2019-06-11 DIAGNOSIS — E66.01 OBESITY, CLASS III, BMI 40-49.9 (MORBID OBESITY) (HCC): Primary | ICD-10-CM

## 2019-06-11 DIAGNOSIS — I10 BENIGN ESSENTIAL HTN: ICD-10-CM

## 2019-06-11 DIAGNOSIS — G89.29 CHRONIC PAIN OF RIGHT KNEE: ICD-10-CM

## 2019-06-11 DIAGNOSIS — M25.561 CHRONIC PAIN OF RIGHT KNEE: ICD-10-CM

## 2019-06-11 PROBLEM — J06.9 VIRAL URI WITH COUGH: Status: RESOLVED | Noted: 2019-01-24 | Resolved: 2019-06-11

## 2019-06-11 PROBLEM — K21.9 GERD (GASTROESOPHAGEAL REFLUX DISEASE): Status: ACTIVE | Noted: 2019-06-11

## 2019-06-11 PROBLEM — B34.9 ACUTE VIRAL SYNDROME: Status: RESOLVED | Noted: 2019-05-29 | Resolved: 2019-06-11

## 2019-06-11 PROBLEM — R68.89 FLU-LIKE SYMPTOMS: Status: RESOLVED | Noted: 2019-05-29 | Resolved: 2019-06-11

## 2019-06-11 PROCEDURE — 99243 OFF/OP CNSLTJ NEW/EST LOW 30: CPT | Performed by: FAMILY MEDICINE

## 2019-06-13 ENCOUNTER — OFFICE VISIT (OUTPATIENT)
Dept: OBGYN CLINIC | Facility: HOSPITAL | Age: 59
End: 2019-06-13
Payer: COMMERCIAL

## 2019-06-13 VITALS
SYSTOLIC BLOOD PRESSURE: 154 MMHG | BODY MASS INDEX: 41.46 KG/M2 | HEART RATE: 93 BPM | HEIGHT: 66 IN | DIASTOLIC BLOOD PRESSURE: 78 MMHG | WEIGHT: 258 LBS

## 2019-06-13 DIAGNOSIS — G89.29 CHRONIC PAIN OF RIGHT KNEE: ICD-10-CM

## 2019-06-13 DIAGNOSIS — M25.561 CHRONIC PAIN OF RIGHT KNEE: ICD-10-CM

## 2019-06-13 DIAGNOSIS — G89.29 CHRONIC PAIN OF LEFT KNEE: ICD-10-CM

## 2019-06-13 DIAGNOSIS — M17.0 PRIMARY OSTEOARTHRITIS OF BOTH KNEES: Primary | ICD-10-CM

## 2019-06-13 DIAGNOSIS — M25.562 CHRONIC PAIN OF LEFT KNEE: ICD-10-CM

## 2019-06-13 PROCEDURE — 99213 OFFICE O/P EST LOW 20 MIN: CPT | Performed by: ORTHOPAEDIC SURGERY

## 2019-06-19 ENCOUNTER — TELEPHONE (OUTPATIENT)
Dept: OBGYN CLINIC | Facility: OTHER | Age: 59
End: 2019-06-19

## 2019-07-08 ENCOUNTER — TELEPHONE (OUTPATIENT)
Dept: OBGYN CLINIC | Facility: OTHER | Age: 59
End: 2019-07-08

## 2019-08-15 DIAGNOSIS — I10 ESSENTIAL HYPERTENSION, BENIGN: ICD-10-CM

## 2019-08-15 RX ORDER — METOPROLOL SUCCINATE 100 MG/1
100 TABLET, EXTENDED RELEASE ORAL DAILY
Qty: 90 TABLET | Refills: 0 | Status: SHIPPED | OUTPATIENT
Start: 2019-08-15 | End: 2019-11-19 | Stop reason: SDUPTHER

## 2019-09-27 ENCOUNTER — OFFICE VISIT (OUTPATIENT)
Dept: OBGYN CLINIC | Facility: MEDICAL CENTER | Age: 59
End: 2019-09-27
Payer: COMMERCIAL

## 2019-09-27 VITALS
HEART RATE: 69 BPM | DIASTOLIC BLOOD PRESSURE: 93 MMHG | SYSTOLIC BLOOD PRESSURE: 143 MMHG | HEIGHT: 67 IN | BODY MASS INDEX: 39.87 KG/M2 | WEIGHT: 254 LBS

## 2019-09-27 DIAGNOSIS — M17.12 PRIMARY OSTEOARTHRITIS OF LEFT KNEE: ICD-10-CM

## 2019-09-27 DIAGNOSIS — M17.11 PRIMARY OSTEOARTHRITIS OF RIGHT KNEE: Primary | ICD-10-CM

## 2019-09-27 PROCEDURE — 20610 DRAIN/INJ JOINT/BURSA W/O US: CPT | Performed by: ORTHOPAEDIC SURGERY

## 2019-09-27 RX ORDER — LIDOCAINE HYDROCHLORIDE 10 MG/ML
2.5 INJECTION, SOLUTION INFILTRATION; PERINEURAL
Status: COMPLETED | OUTPATIENT
Start: 2019-09-27 | End: 2019-09-27

## 2019-09-27 RX ADMIN — LIDOCAINE HYDROCHLORIDE 2.5 ML: 10 INJECTION, SOLUTION INFILTRATION; PERINEURAL at 11:18

## 2019-09-27 RX ADMIN — LIDOCAINE HYDROCHLORIDE 2.5 ML: 10 INJECTION, SOLUTION INFILTRATION; PERINEURAL at 11:17

## 2019-09-27 NOTE — PROGRESS NOTES
Assessment:  1  Primary osteoarthritis of right knee     2  Primary osteoarthritis of left knee         Plan:  The patient was provided with bilateral Synvisc One injections  She should follow up in 3 months  To do next visit:  Return in about 3 months (around 12/27/2019)  The above stated was discussed in layman's terms and the patient expressed understanding  All questions were answered to the patient's satisfaction  Scribe Attestation    I,:   Britt Rocha am acting as a scribe while in the presence of the attending physician :        I,:   Kasia Quintanilla MD personally performed the services described in this documentation    as scribed in my presence :              Subjective:   Antonio Christian is a 63 y o  female who presents for follow up of bilateral knees  Today she complains of bilateral generalized knee pain  She rates her pain at 5/10 and 6/10 at its worse  Walking aggravates  Sitting alleviates  Mornings are the worse  She does use Aleve on occasion  She is s/p bilateral knee steroid injections 12/18/18 with no benefit  She denies past knee surgery          Review of systems negative unless otherwise specified in HPI    Past Medical History:   Diagnosis Date    Allergic     Anemia     Enlarged thyroid     GERD (gastroesophageal reflux disease)     Hypertension     Obesity     Osteoarthritis of both knees 11/26/2018       Past Surgical History:   Procedure Laterality Date    ELBOW SURGERY      Boken no surgery    ENDOMETRIAL ABLATION      WISDOM TOOTH EXTRACTION         Family History   Problem Relation Age of Onset    Hypertension Mother         benign essential     Heart attack Mother         recent    [facility] Hypertension Father         benign essential     Arthritis Father     Breast cancer Paternal Grandmother        Social History     Occupational History    Not on file   Tobacco Use    Smoking status: Former Smoker     Packs/day: 0 25     Years: 10 00     Pack years: Post AF ablation on 6/5/17, C/O Left side groin pain 3 inches below puncture site.  Can not lift leg while supine at all without severe pain. Discomfort while walking and weight bearing. No increase in bruising or bleeding.    Advised ED care. Patient accepting and verbalizing understanding.   Report called.   Dr. Jain updated and agrees with plan of care.   2 50     Types: Cigarettes     Last attempt to quit: 1989     Years since quittin 6    Smokeless tobacco: Never Used   Substance and Sexual Activity    Alcohol use: Yes     Frequency: Monthly or less     Drinks per session: 1 or 2     Binge frequency: Never     Comment: occasional     Drug use: No    Sexual activity: Not on file         Current Outpatient Medications:     aspirin (ECOTRIN LOW STRENGTH) 81 mg EC tablet, Take 1 tablet (81 mg total) by mouth daily, Disp: 90 tablet, Rfl: 1    calcium citrate-vitamin D (CITRACAL+D) 315-200 MG-UNIT per tablet, Take 1 tablet by mouth daily, Disp: , Rfl:     hydrochlorothiazide (HYDRODIURIL) 25 mg tablet, Take 1 tablet (25 mg total) by mouth 2 (two) times a day, Disp: 180 tablet, Rfl: 1    losartan (COZAAR) 100 MG tablet, Take 1 tablet (100 mg total) by mouth daily, Disp: 90 tablet, Rfl: 3    metoprolol succinate (TOPROL-XL) 100 mg 24 hr tablet, Take 1 tablet (100 mg total) by mouth daily, Disp: 90 tablet, Rfl: 0    Multiple Vitamins-Minerals (CENTRUM SILVER) tablet, Take 1 tablet by mouth daily, Disp: , Rfl:     Omega-3 Fatty Acids (FISH OIL) 1,000 mg, Take 2 capsules by mouth daily  , Disp: , Rfl:     Glucos-Chond-Hyal Ac-Ca Fructo (MOVE FREE JOINT HEALTH ADVANCE) TABS, Take 1 tablet by mouth daily, Disp: , Rfl:     Allergies   Allergen Reactions    Morphine Vomiting    Percocet [Oxycodone-Acetaminophen] GI Intolerance    Hydrocodone-Acetaminophen GI Intolerance            Vitals:    19 1040   BP: 143/93   Pulse: 69       Objective:  Physical exam  · General: Awake, Alert, Oriented  · Eyes: Pupils equal, round and reactive to light  · Heart: regular rate and rhythm  · Lungs: No audible wheezing  · Abdomen: soft                    Ortho Exam   Bilateral knees:  Varus alignment  No erythema or ecchymosis  No effusion   Mild swelling  Normal strength  Good ROM   Calf compartments soft and supple  Sensation intact  Toes are warm sensate and mobile      Diagnostics, reviewed and taken today if performed as documented:    None performed       Procedures, if performed today:    Large joint arthrocentesis: R knee  Date/Time: 9/27/2019 11:17 AM  Consent given by: patient  Site marked: site marked  Timeout: Immediately prior to procedure a time out was called to verify the correct patient, procedure, equipment, support staff and site/side marked as required   Supporting Documentation  Indications: pain   Procedure Details  Location: knee - R knee  Preparation: Patient was prepped and draped in the usual sterile fashion  Needle size: 22 G  Ultrasound guidance: no  Approach: anterolateral  Medications administered: 48 mg hylan 48 MG/6ML; 2 5 mL lidocaine 1 %    Patient tolerance: patient tolerated the procedure well with no immediate complications  Dressing:  Sterile dressing applied    Large joint arthrocentesis: L knee  Date/Time: 9/27/2019 11:18 AM  Consent given by: patient  Site marked: site marked  Timeout: Immediately prior to procedure a time out was called to verify the correct patient, procedure, equipment, support staff and site/side marked as required   Supporting Documentation  Indications: pain   Procedure Details  Location: knee - L knee  Preparation: Patient was prepped and draped in the usual sterile fashion  Needle size: 22 G  Ultrasound guidance: no  Approach: anteromedial  Medications administered: 48 mg hylan 48 MG/6ML; 2 5 mL lidocaine 1 %    Patient tolerance: patient tolerated the procedure well with no immediate complications  Dressing:  Sterile dressing applied                Portions of the record may have been created with voice recognition software  Occasional wrong word or "sound a like" substitutions may have occurred due to the inherent limitations of voice recognition software  Read the chart carefully and recognize, using context, where substitutions have occurred

## 2019-10-21 DIAGNOSIS — I10 ESSENTIAL HYPERTENSION, BENIGN: ICD-10-CM

## 2019-10-21 RX ORDER — ASPIRIN 81 MG/1
81 TABLET ORAL DAILY
Qty: 90 TABLET | Refills: 1 | Status: SHIPPED | OUTPATIENT
Start: 2019-10-21 | End: 2020-04-16 | Stop reason: SDUPTHER

## 2019-10-21 NOTE — TELEPHONE ENCOUNTER
Patient needs refills of her Aspirin 81 mg   Please send to [id number] for a 90Day supply

## 2019-11-07 ENCOUNTER — TELEPHONE (OUTPATIENT)
Dept: INTERNAL MEDICINE CLINIC | Facility: CLINIC | Age: 59
End: 2019-11-07

## 2019-11-07 DIAGNOSIS — I10 ESSENTIAL HYPERTENSION, BENIGN: ICD-10-CM

## 2019-11-18 NOTE — TELEPHONE ENCOUNTER
Pt coming in dec she made sahara she need toprolol refilled she is asking for refill but she gets 3 month sent to mail order

## 2019-11-19 RX ORDER — METOPROLOL SUCCINATE 100 MG/1
100 TABLET, EXTENDED RELEASE ORAL DAILY
Qty: 90 TABLET | Refills: 0 | Status: SHIPPED | OUTPATIENT
Start: 2019-11-19 | End: 2020-02-12 | Stop reason: SDUPTHER

## 2019-11-29 ENCOUNTER — TELEPHONE (OUTPATIENT)
Dept: INTERNAL MEDICINE CLINIC | Age: 59
End: 2019-11-29

## 2019-11-29 DIAGNOSIS — E66.01 OBESITY, CLASS III, BMI 40-49.9 (MORBID OBESITY) (HCC): ICD-10-CM

## 2019-11-29 DIAGNOSIS — K21.9 GASTROESOPHAGEAL REFLUX DISEASE WITHOUT ESOPHAGITIS: ICD-10-CM

## 2019-11-29 DIAGNOSIS — I87.2 CHRONIC VENOUS INSUFFICIENCY: Primary | ICD-10-CM

## 2019-11-29 DIAGNOSIS — E78.1 HYPERTRIGLYCERIDEMIA: ICD-10-CM

## 2019-11-29 NOTE — TELEPHONE ENCOUNTER
Patient said she called more than a week ago to get new orders for her yearly labs and thyroid studies  She wanted to pick them up today and get the blood work on Monday  She has an appointment for 12/12/19  Please call [phone] when ready  Thanks

## 2019-12-12 ENCOUNTER — OFFICE VISIT (OUTPATIENT)
Dept: INTERNAL MEDICINE CLINIC | Facility: CLINIC | Age: 59
End: 2019-12-12
Payer: COMMERCIAL

## 2019-12-12 VITALS
SYSTOLIC BLOOD PRESSURE: 126 MMHG | HEIGHT: 66 IN | WEIGHT: 257.6 LBS | OXYGEN SATURATION: 98 % | TEMPERATURE: 98.1 F | BODY MASS INDEX: 41.4 KG/M2 | HEART RATE: 65 BPM | DIASTOLIC BLOOD PRESSURE: 74 MMHG

## 2019-12-12 DIAGNOSIS — I10 BENIGN ESSENTIAL HTN: ICD-10-CM

## 2019-12-12 DIAGNOSIS — E66.01 OBESITY, CLASS III, BMI 40-49.9 (MORBID OBESITY) (HCC): ICD-10-CM

## 2019-12-12 DIAGNOSIS — M17.12 PRIMARY OSTEOARTHRITIS OF LEFT KNEE: ICD-10-CM

## 2019-12-12 DIAGNOSIS — Z11.4 ENCOUNTER FOR SCREENING FOR HIV: ICD-10-CM

## 2019-12-12 DIAGNOSIS — K21.9 GASTROESOPHAGEAL REFLUX DISEASE WITHOUT ESOPHAGITIS: ICD-10-CM

## 2019-12-12 DIAGNOSIS — M17.11 PRIMARY OSTEOARTHRITIS OF RIGHT KNEE: ICD-10-CM

## 2019-12-12 DIAGNOSIS — E78.1 HYPERTRIGLYCERIDEMIA: ICD-10-CM

## 2019-12-12 DIAGNOSIS — Z12.39 SCREENING FOR BREAST CANCER: Primary | ICD-10-CM

## 2019-12-12 PROCEDURE — 3008F BODY MASS INDEX DOCD: CPT | Performed by: INTERNAL MEDICINE

## 2019-12-12 PROCEDURE — 99214 OFFICE O/P EST MOD 30 MIN: CPT | Performed by: INTERNAL MEDICINE

## 2019-12-12 PROCEDURE — 1036F TOBACCO NON-USER: CPT | Performed by: INTERNAL MEDICINE

## 2019-12-12 PROCEDURE — 3074F SYST BP LT 130 MM HG: CPT | Performed by: INTERNAL MEDICINE

## 2019-12-12 PROCEDURE — 3078F DIAST BP <80 MM HG: CPT | Performed by: INTERNAL MEDICINE

## 2019-12-12 NOTE — PROGRESS NOTES
Assessment/Plan          Subjective     Chief Complaint:     Chief Complaint   Patient presents with    Follow-up     Chronic conditions- No refills needed  Pt would like to discuss lab results today  HPI:   Patient has chronic knee pain for which she has seen an orthopedic  She states that the orthopedic is not willing to operate due to her weight  She has tried cortisol injections  She will take aleve as needed for symptom relief  She has not tried physical therapy  She is not able to walk far distances due to the pain  She has trouble walking up steps  She is considering getting a second opinion for her knee and possible surgery  She is also here to review lab work  Discuss in detail regarding diet exercise and weight loss which will be good for her osteoarthritis of the knee and also for the hypertriglyceridemia overall fasting blood sugar is good hypertension is very well controlled  She went before to the weight loss program and she was not very successful with the otherwise everything looks good will follow her up in about of 6 month unless  The following portions of the patient's history were reviewed and updated as appropriate: allergies, current medications, past family history, past medical history, past social history, past surgical history and problem list     Review of Systems  Review of Systems   Constitutional: Negative for chills, fatigue and fever  HENT: Negative for congestion and sneezing  Respiratory: Negative for cough and shortness of breath  Cardiovascular: Negative for chest pain  Gastrointestinal: Positive for constipation  Negative for diarrhea, nausea and vomiting  Genitourinary: Negative for difficulty urinating  Musculoskeletal: Positive for arthralgias (L knee pain)  Negative for joint swelling and myalgias  Neurological: Negative for dizziness, weakness and light-headedness           Objective   Vitals:    12/12/19 0853   BP: 126/74   Pulse: 65   Temp: 98 1 °F (36 7 °C)   SpO2: 98%       Physical Exam   Constitutional: She is oriented to person, place, and time  She appears well-nourished  HENT:   Head: Normocephalic  Eyes: Pupils are equal, round, and reactive to light  Neck: Normal range of motion  Cardiovascular: Normal rate and regular rhythm  No murmur heard  Pulmonary/Chest: Breath sounds normal    Abdominal: Soft  Bowel sounds are normal    Neurological: She is alert and oriented to person, place, and time  Skin: Skin is warm  Psychiatric: She has a normal mood and affect  Her behavior is normal  Thought content normal        Lab Results: I have reviewed all the lab results

## 2019-12-20 ENCOUNTER — TRANSCRIBE ORDERS (OUTPATIENT)
Dept: ADMINISTRATIVE | Facility: HOSPITAL | Age: 59
End: 2019-12-20

## 2019-12-23 ENCOUNTER — HOSPITAL ENCOUNTER (OUTPATIENT)
Dept: RADIOLOGY | Facility: IMAGING CENTER | Age: 59
Discharge: HOME | End: 2019-12-23
Payer: COMMERCIAL

## 2019-12-23 VITALS — WEIGHT: 257 LBS | BODY MASS INDEX: 41.3 KG/M2 | HEIGHT: 66 IN

## 2019-12-23 DIAGNOSIS — Z12.39 SCREENING FOR BREAST CANCER: ICD-10-CM

## 2019-12-23 PROCEDURE — 77067 SCR MAMMO BI INCL CAD: CPT

## 2020-02-12 DIAGNOSIS — I10 ESSENTIAL HYPERTENSION, BENIGN: ICD-10-CM

## 2020-02-13 RX ORDER — METOPROLOL SUCCINATE 100 MG/1
100 TABLET, EXTENDED RELEASE ORAL DAILY
Qty: 90 TABLET | Refills: 1 | Status: SHIPPED | OUTPATIENT
Start: 2020-02-13 | End: 2020-06-03 | Stop reason: SDUPTHER

## 2020-02-13 RX ORDER — HYDROCHLOROTHIAZIDE 25 MG/1
25 TABLET ORAL 2 TIMES DAILY
Qty: 180 TABLET | Refills: 1 | Status: SHIPPED | OUTPATIENT
Start: 2020-02-13 | End: 2020-03-02

## 2020-02-22 DIAGNOSIS — I10 ESSENTIAL HYPERTENSION, BENIGN: ICD-10-CM

## 2020-03-02 RX ORDER — HYDROCHLOROTHIAZIDE 25 MG/1
TABLET ORAL
Qty: 180 TABLET | Refills: 1 | Status: SHIPPED | OUTPATIENT
Start: 2020-03-02 | End: 2020-10-26

## 2020-04-04 DIAGNOSIS — I10 ESSENTIAL HYPERTENSION, BENIGN: ICD-10-CM

## 2020-04-06 RX ORDER — ASPIRIN 81 MG/1
TABLET, COATED ORAL
Qty: 90 TABLET | Refills: 1 | OUTPATIENT
Start: 2020-04-06

## 2020-04-07 RX ORDER — LOSARTAN POTASSIUM 100 MG/1
TABLET ORAL
Qty: 90 TABLET | Refills: 1 | Status: SHIPPED | OUTPATIENT
Start: 2020-04-07 | End: 2020-08-20

## 2020-04-16 DIAGNOSIS — I10 ESSENTIAL HYPERTENSION, BENIGN: ICD-10-CM

## 2020-04-16 RX ORDER — ASPIRIN 81 MG/1
81 TABLET ORAL DAILY
Qty: 90 TABLET | Refills: 1 | Status: SHIPPED | OUTPATIENT
Start: 2020-04-16 | End: 2020-09-03 | Stop reason: SDUPTHER

## 2020-04-16 RX ORDER — LOSARTAN POTASSIUM 100 MG/1
100 TABLET ORAL DAILY
Qty: 90 TABLET | Refills: 1 | Status: CANCELLED | OUTPATIENT
Start: 2020-04-16

## 2020-06-03 ENCOUNTER — OFFICE VISIT (OUTPATIENT)
Dept: INTERNAL MEDICINE CLINIC | Facility: CLINIC | Age: 60
End: 2020-06-03
Payer: COMMERCIAL

## 2020-06-03 VITALS
WEIGHT: 269.6 LBS | SYSTOLIC BLOOD PRESSURE: 136 MMHG | TEMPERATURE: 97.9 F | OXYGEN SATURATION: 96 % | DIASTOLIC BLOOD PRESSURE: 80 MMHG | BODY MASS INDEX: 43.33 KG/M2 | HEART RATE: 76 BPM | HEIGHT: 66 IN

## 2020-06-03 DIAGNOSIS — I10 BENIGN ESSENTIAL HTN: Primary | ICD-10-CM

## 2020-06-03 DIAGNOSIS — E66.01 OBESITY, CLASS III, BMI 40-49.9 (MORBID OBESITY) (HCC): ICD-10-CM

## 2020-06-03 DIAGNOSIS — M25.561 CHRONIC PAIN OF RIGHT KNEE: ICD-10-CM

## 2020-06-03 DIAGNOSIS — I10 ESSENTIAL HYPERTENSION, BENIGN: ICD-10-CM

## 2020-06-03 DIAGNOSIS — G89.29 CHRONIC PAIN OF RIGHT KNEE: ICD-10-CM

## 2020-06-03 DIAGNOSIS — E78.1 HYPERTRIGLYCERIDEMIA: ICD-10-CM

## 2020-06-03 PROCEDURE — 99214 OFFICE O/P EST MOD 30 MIN: CPT | Performed by: INTERNAL MEDICINE

## 2020-06-03 PROCEDURE — 3075F SYST BP GE 130 - 139MM HG: CPT | Performed by: INTERNAL MEDICINE

## 2020-06-03 PROCEDURE — 3008F BODY MASS INDEX DOCD: CPT | Performed by: INTERNAL MEDICINE

## 2020-06-03 PROCEDURE — 3079F DIAST BP 80-89 MM HG: CPT | Performed by: INTERNAL MEDICINE

## 2020-06-03 PROCEDURE — 1036F TOBACCO NON-USER: CPT | Performed by: INTERNAL MEDICINE

## 2020-06-03 RX ORDER — METOPROLOL SUCCINATE 100 MG/1
100 TABLET, EXTENDED RELEASE ORAL DAILY
Qty: 90 TABLET | Refills: 1 | Status: SHIPPED | OUTPATIENT
Start: 2020-06-03 | End: 2020-12-21

## 2020-08-20 DIAGNOSIS — I10 ESSENTIAL HYPERTENSION, BENIGN: ICD-10-CM

## 2020-08-20 RX ORDER — LOSARTAN POTASSIUM 100 MG/1
TABLET ORAL
Qty: 90 TABLET | Refills: 1 | Status: SHIPPED | OUTPATIENT
Start: 2020-08-20 | End: 2021-04-12 | Stop reason: SDUPTHER

## 2020-09-03 DIAGNOSIS — I10 ESSENTIAL HYPERTENSION, BENIGN: ICD-10-CM

## 2020-09-03 RX ORDER — ASPIRIN 81 MG/1
81 TABLET ORAL DAILY
Qty: 90 TABLET | Refills: 1 | Status: SHIPPED | OUTPATIENT
Start: 2020-09-03 | End: 2021-04-12 | Stop reason: SDUPTHER

## 2020-10-24 DIAGNOSIS — I10 ESSENTIAL HYPERTENSION, BENIGN: ICD-10-CM

## 2020-10-26 RX ORDER — HYDROCHLOROTHIAZIDE 25 MG/1
TABLET ORAL
Qty: 180 TABLET | Refills: 1 | Status: SHIPPED | OUTPATIENT
Start: 2020-10-26 | End: 2020-11-03 | Stop reason: SDUPTHER

## 2020-11-03 DIAGNOSIS — I10 ESSENTIAL HYPERTENSION, BENIGN: ICD-10-CM

## 2020-11-03 RX ORDER — HYDROCHLOROTHIAZIDE 25 MG/1
25 TABLET ORAL 2 TIMES DAILY
Qty: 180 TABLET | Refills: 1 | Status: SHIPPED | OUTPATIENT
Start: 2020-11-03 | End: 2021-04-12 | Stop reason: SDUPTHER

## 2020-12-21 DIAGNOSIS — I10 ESSENTIAL HYPERTENSION, BENIGN: ICD-10-CM

## 2020-12-21 RX ORDER — METOPROLOL SUCCINATE 100 MG/1
TABLET, EXTENDED RELEASE ORAL
Qty: 90 TABLET | Refills: 1 | Status: SHIPPED | OUTPATIENT
Start: 2020-12-21 | End: 2021-06-07

## 2020-12-26 LAB — HBA1C MFR BLD HPLC: 5.4 %

## 2020-12-29 ENCOUNTER — OFFICE VISIT (OUTPATIENT)
Dept: INTERNAL MEDICINE CLINIC | Facility: CLINIC | Age: 60
End: 2020-12-29
Payer: COMMERCIAL

## 2020-12-29 VITALS
HEART RATE: 84 BPM | BODY MASS INDEX: 44.32 KG/M2 | OXYGEN SATURATION: 98 % | WEIGHT: 275.8 LBS | TEMPERATURE: 97.9 F | SYSTOLIC BLOOD PRESSURE: 120 MMHG | HEIGHT: 66 IN | DIASTOLIC BLOOD PRESSURE: 70 MMHG

## 2020-12-29 DIAGNOSIS — Z23 NEED FOR PNEUMOCOCCAL VACCINATION: ICD-10-CM

## 2020-12-29 DIAGNOSIS — Z13.820 ENCOUNTER FOR SCREENING FOR OSTEOPOROSIS: Primary | ICD-10-CM

## 2020-12-29 DIAGNOSIS — I10 BENIGN ESSENTIAL HTN: ICD-10-CM

## 2020-12-29 DIAGNOSIS — Z12.31 ENCOUNTER FOR SCREENING MAMMOGRAM FOR MALIGNANT NEOPLASM OF BREAST: ICD-10-CM

## 2020-12-29 DIAGNOSIS — E66.01 OBESITY, CLASS III, BMI 40-49.9 (MORBID OBESITY) (HCC): ICD-10-CM

## 2020-12-29 DIAGNOSIS — K21.9 GASTROESOPHAGEAL REFLUX DISEASE WITHOUT ESOPHAGITIS: ICD-10-CM

## 2020-12-29 DIAGNOSIS — E78.1 HYPERTRIGLYCERIDEMIA: ICD-10-CM

## 2020-12-29 PROCEDURE — 3725F SCREEN DEPRESSION PERFORMED: CPT | Performed by: INTERNAL MEDICINE

## 2020-12-29 PROCEDURE — 3078F DIAST BP <80 MM HG: CPT | Performed by: INTERNAL MEDICINE

## 2020-12-29 PROCEDURE — 90732 PPSV23 VACC 2 YRS+ SUBQ/IM: CPT

## 2020-12-29 PROCEDURE — 1036F TOBACCO NON-USER: CPT | Performed by: INTERNAL MEDICINE

## 2020-12-29 PROCEDURE — 3008F BODY MASS INDEX DOCD: CPT | Performed by: INTERNAL MEDICINE

## 2020-12-29 PROCEDURE — 90471 IMMUNIZATION ADMIN: CPT

## 2020-12-29 PROCEDURE — 99214 OFFICE O/P EST MOD 30 MIN: CPT | Performed by: INTERNAL MEDICINE

## 2020-12-29 PROCEDURE — 3074F SYST BP LT 130 MM HG: CPT | Performed by: INTERNAL MEDICINE

## 2021-01-06 ENCOUNTER — HOSPITAL ENCOUNTER (OUTPATIENT)
Dept: RADIOLOGY | Facility: IMAGING CENTER | Age: 61
Discharge: HOME | End: 2021-01-06
Payer: COMMERCIAL

## 2021-01-06 DIAGNOSIS — Z13.820 ENCOUNTER FOR SCREENING FOR OSTEOPOROSIS: ICD-10-CM

## 2021-01-06 PROCEDURE — 77080 DXA BONE DENSITY AXIAL: CPT

## 2021-02-12 DIAGNOSIS — I10 ESSENTIAL HYPERTENSION, BENIGN: ICD-10-CM

## 2021-02-19 RX ORDER — LOSARTAN POTASSIUM 100 MG/1
TABLET ORAL
Qty: 90 TABLET | Refills: 1 | OUTPATIENT
Start: 2021-02-19

## 2021-03-05 DIAGNOSIS — I10 ESSENTIAL HYPERTENSION, BENIGN: ICD-10-CM

## 2021-03-05 RX ORDER — ASPIRIN 81 MG/1
TABLET, COATED ORAL
Qty: 90 TABLET | Refills: 1 | OUTPATIENT
Start: 2021-03-05

## 2021-04-12 DIAGNOSIS — I10 ESSENTIAL HYPERTENSION, BENIGN: ICD-10-CM

## 2021-04-12 RX ORDER — HYDROCHLOROTHIAZIDE 25 MG/1
25 TABLET ORAL 2 TIMES DAILY
Qty: 180 TABLET | Refills: 1 | Status: SHIPPED | OUTPATIENT
Start: 2021-04-12 | End: 2021-10-13 | Stop reason: SDUPTHER

## 2021-04-12 RX ORDER — ASPIRIN 81 MG/1
81 TABLET ORAL DAILY
Qty: 90 TABLET | Refills: 1 | Status: SHIPPED | OUTPATIENT
Start: 2021-04-12 | End: 2021-10-13 | Stop reason: SDUPTHER

## 2021-04-12 RX ORDER — LOSARTAN POTASSIUM 100 MG/1
100 TABLET ORAL DAILY
Qty: 90 TABLET | Refills: 1 | Status: SHIPPED | OUTPATIENT
Start: 2021-04-12 | End: 2021-10-13 | Stop reason: SDUPTHER

## 2021-05-05 ENCOUNTER — HOSPITAL ENCOUNTER (OUTPATIENT)
Dept: RADIOLOGY | Age: 61
Discharge: HOME | End: 2021-05-05
Payer: COMMERCIAL

## 2021-05-05 VITALS — HEIGHT: 66 IN | BODY MASS INDEX: 44.2 KG/M2 | WEIGHT: 275 LBS

## 2021-05-05 DIAGNOSIS — Z12.31 ENCOUNTER FOR SCREENING MAMMOGRAM FOR MALIGNANT NEOPLASM OF BREAST: ICD-10-CM

## 2021-05-05 PROCEDURE — 77063 BREAST TOMOSYNTHESIS BI: CPT

## 2021-05-05 PROCEDURE — 77067 SCR MAMMO BI INCL CAD: CPT

## 2021-06-07 DIAGNOSIS — I10 ESSENTIAL HYPERTENSION, BENIGN: ICD-10-CM

## 2021-06-07 RX ORDER — METOPROLOL SUCCINATE 100 MG/1
TABLET, EXTENDED RELEASE ORAL
Qty: 90 TABLET | Refills: 1 | Status: SHIPPED | OUTPATIENT
Start: 2021-06-07 | End: 2022-01-07 | Stop reason: SDUPTHER

## 2021-06-17 ENCOUNTER — OFFICE VISIT (OUTPATIENT)
Dept: INTERNAL MEDICINE CLINIC | Facility: CLINIC | Age: 61
End: 2021-06-17
Payer: COMMERCIAL

## 2021-06-17 VITALS
DIASTOLIC BLOOD PRESSURE: 82 MMHG | TEMPERATURE: 98.3 F | SYSTOLIC BLOOD PRESSURE: 122 MMHG | HEIGHT: 66 IN | BODY MASS INDEX: 43.71 KG/M2 | OXYGEN SATURATION: 98 % | HEART RATE: 71 BPM | WEIGHT: 272 LBS

## 2021-06-17 DIAGNOSIS — E78.1 HYPERTRIGLYCERIDEMIA: ICD-10-CM

## 2021-06-17 DIAGNOSIS — M17.11 PRIMARY OSTEOARTHRITIS OF RIGHT KNEE: ICD-10-CM

## 2021-06-17 DIAGNOSIS — I10 BENIGN ESSENTIAL HTN: ICD-10-CM

## 2021-06-17 DIAGNOSIS — E66.01 OBESITY, CLASS III, BMI 40-49.9 (MORBID OBESITY) (HCC): Primary | ICD-10-CM

## 2021-06-17 PROCEDURE — 99213 OFFICE O/P EST LOW 20 MIN: CPT | Performed by: INTERNAL MEDICINE

## 2021-06-17 NOTE — PROGRESS NOTES
Assessment/Plan:     Diagnoses and all orders for this visit:    Obesity, Class III, BMI 40-49 9 (morbid obesity) ([facility])  Weight loss and diet  Benign essential HTN  Hypertension is very well controlled  Hypertriglyceridemia  Very well controlled hypertriglyceridemia  Primary osteoarthritis of right knee    follow-up with orthopedics    BMI Counseling: Body mass index is 44 57 kg/m²  The BMI is above normal  Nutrition recommendations include decreasing portion sizes, encouraging healthy choices of fruits and vegetables and moderation in carbohydrate intake  Exercise recommendations include moderate physical activity 150 minutes/week  Subjective:   Chief Complaint   Patient presents with    Follow-up     lbw 06/10/2021, no refills needed        Patient ID: Sarita Mak is a 62 y o  female  This is 62 years young lady who is here for the regular follow-up she is doing well no new complaints she had a workup done which was essentially unremarkable  The big problem is weight and she is trying to lose weight but she cannot because she cannot do any exercises she should more consent rate on her diet so that she can get rid of the weight can not do exercises and also can go back to Orthopedics for possible knee surgery  Hypertension is very well controlled  Hypercholesterolemia slightly increase triglycerides but nothing else        The following portions of the patient's history were reviewed and updated as appropriate: allergies, current medications, past family history, past medical history, past social history, past surgical history and problem list     Review of Systems   Constitutional: Negative for chills and fatigue  HENT: Negative for congestion, ear pain, hearing loss, postnasal drip, sinus pressure, sore throat and voice change  Eyes: Negative for pain, discharge and visual disturbance  Respiratory: Negative for cough, chest tightness and shortness of breath      Cardiovascular: Negative for chest pain, palpitations and leg swelling  Gastrointestinal: Negative for abdominal pain, blood in stool, diarrhea, nausea and rectal pain  Genitourinary: Negative for difficulty urinating, dysuria and urgency  Musculoskeletal: Negative for arthralgias and joint swelling  Knee pain, both feet hurts and because of the knee and the feet pain she cannot do much exercises   Skin: Negative for rash  Allergic/Immunologic: Negative for environmental allergies and food allergies  Neurological: Negative for dizziness, tremors, weakness, numbness and headaches  Hematological: Negative for adenopathy  Psychiatric/Behavioral: Negative for behavioral problems and hallucinations           Past Medical History:   Diagnosis Date    Allergic     Anemia     Enlarged thyroid     GERD (gastroesophageal reflux disease)     Hypertension     Obesity     Osteoarthritis of both knees 11/26/2018         Current Outpatient Medications:     aspirin (ECOTRIN LOW STRENGTH) 81 mg EC tablet, Take 1 tablet (81 mg total) by mouth daily, Disp: 90 tablet, Rfl: 1    calcium citrate-vitamin D (CITRACAL+D) 315-200 MG-UNIT per tablet, Take 1 tablet by mouth Every other day, Disp: , Rfl:     hydrochlorothiazide (HYDRODIURIL) 25 mg tablet, Take 1 tablet (25 mg total) by mouth 2 (two) times a day, Disp: 180 tablet, Rfl: 1    losartan (COZAAR) 100 MG tablet, Take 1 tablet (100 mg total) by mouth daily, Disp: 90 tablet, Rfl: 1    metoprolol succinate (TOPROL-XL) 100 mg 24 hr tablet, TAKE ONE TABLET BY MOUTH EVERY DAY, Disp: 90 tablet, Rfl: 1    Multiple Vitamins-Minerals (CENTRUM SILVER) tablet, Take 1 tablet by mouth daily, Disp: , Rfl:     Multiple Vitamins-Minerals (ZINC PO), Take by mouth as needed, Disp: , Rfl:     Omega-3 Fatty Acids (Fish Oil) 1200 MG CAPS, Take 1 capsule by mouth every other day , Disp: , Rfl:     Allergies   Allergen Reactions    Morphine Vomiting    Percocet [Oxycodone-Acetaminophen] GI Intolerance    Hydrocodone-Acetaminophen GI Intolerance       Social History   Past Surgical History:   Procedure Laterality Date    ELBOW SURGERY      Boken no surgery    ENDOMETRIAL ABLATION      WISDOM TOOTH EXTRACTION       Family History   Problem Relation Age of Onset    Hypertension Mother         benign essential     Heart attack Mother         recent    Kristen Clunes Hypertension Father         benign essential     Rheum arthritis Father     Breast cancer Paternal Grandmother 80    No Known Problems Sister     Stroke Daughter         as infant    Osteoarthritis Maternal Grandmother     No Known Problems Maternal Grandfather     No Known Problems Paternal Grandfather     Cervical cancer Maternal Aunt 66    Colon cancer Maternal Aunt 61    No Known Problems Paternal Aunt     No Known Problems Sister        Objective:  /82 (BP Location: Left arm, Patient Position: Sitting, Cuff Size: Large)   Pulse 71   Temp 98 3 °F (36 8 °C) (Temporal)   Ht 5' 5 5" (1 664 m)   Wt 123 kg (272 lb)   SpO2 98% Comment: ra  BMI 44 57 kg/m²        Physical Exam  Constitutional:       Appearance: She is well-developed  She is obese  HENT:      Right Ear: External ear normal    Eyes:      Conjunctiva/sclera: Conjunctivae normal       Pupils: Pupils are equal, round, and reactive to light  Neck:      Thyroid: No thyromegaly  Vascular: No JVD  Cardiovascular:      Rate and Rhythm: Normal rate and regular rhythm  Heart sounds: Normal heart sounds  Comments: Bilateral varicose and spider veins with some chronic venous insufficiency  Pulmonary:      Breath sounds: Normal breath sounds  Abdominal:      General: Bowel sounds are normal       Palpations: Abdomen is soft  Musculoskeletal:         General: Normal range of motion  Cervical back: Normal range of motion  Lymphadenopathy:      Cervical: No cervical adenopathy  Skin:     General: Skin is dry     Neurological:      Mental Status: She is alert and oriented to person, place, and time  Deep Tendon Reflexes: Reflexes are normal and symmetric     Psychiatric:         Behavior: Behavior normal

## 2021-10-13 DIAGNOSIS — I10 ESSENTIAL HYPERTENSION, BENIGN: ICD-10-CM

## 2021-10-13 RX ORDER — ASPIRIN 81 MG/1
81 TABLET ORAL DAILY
Qty: 90 TABLET | Refills: 1 | Status: SHIPPED | OUTPATIENT
Start: 2021-10-13

## 2021-10-13 RX ORDER — HYDROCHLOROTHIAZIDE 25 MG/1
25 TABLET ORAL 2 TIMES DAILY
Qty: 180 TABLET | Refills: 1 | Status: SHIPPED | OUTPATIENT
Start: 2021-10-13 | End: 2022-03-15 | Stop reason: SDUPTHER

## 2021-10-13 RX ORDER — LOSARTAN POTASSIUM 100 MG/1
100 TABLET ORAL DAILY
Qty: 90 TABLET | Refills: 1 | Status: SHIPPED | OUTPATIENT
Start: 2021-10-13 | End: 2022-03-15 | Stop reason: SDUPTHER

## 2021-11-26 ENCOUNTER — VBI (OUTPATIENT)
Dept: ADMINISTRATIVE | Facility: OTHER | Age: 61
End: 2021-11-26

## 2021-12-03 ENCOUNTER — TELEPHONE (OUTPATIENT)
Dept: INTERNAL MEDICINE CLINIC | Age: 61
End: 2021-12-03

## 2022-01-07 DIAGNOSIS — I10 ESSENTIAL HYPERTENSION, BENIGN: ICD-10-CM

## 2022-01-07 RX ORDER — METOPROLOL SUCCINATE 100 MG/1
100 TABLET, EXTENDED RELEASE ORAL DAILY
Qty: 90 TABLET | Refills: 1 | Status: SHIPPED | OUTPATIENT
Start: 2022-01-07 | End: 2022-03-15 | Stop reason: SDUPTHER

## 2022-01-24 ENCOUNTER — OFFICE VISIT (OUTPATIENT)
Dept: INTERNAL MEDICINE CLINIC | Facility: OTHER | Age: 62
End: 2022-01-24
Payer: COMMERCIAL

## 2022-01-24 VITALS
BODY MASS INDEX: 43.71 KG/M2 | SYSTOLIC BLOOD PRESSURE: 124 MMHG | OXYGEN SATURATION: 99 % | HEIGHT: 66 IN | WEIGHT: 272 LBS | HEART RATE: 72 BPM | TEMPERATURE: 98.2 F | DIASTOLIC BLOOD PRESSURE: 80 MMHG

## 2022-01-24 DIAGNOSIS — E66.01 OBESITY, CLASS III, BMI 40-49.9 (MORBID OBESITY) (HCC): ICD-10-CM

## 2022-01-24 DIAGNOSIS — E78.1 HYPERTRIGLYCERIDEMIA: ICD-10-CM

## 2022-01-24 DIAGNOSIS — I10 ESSENTIAL HYPERTENSION, BENIGN: Primary | ICD-10-CM

## 2022-01-24 PROCEDURE — 99214 OFFICE O/P EST MOD 30 MIN: CPT | Performed by: INTERNAL MEDICINE

## 2022-01-24 NOTE — PROGRESS NOTES
[facility] PRIMARY CARE  ASSESSMENT/PLAN:  Diagnoses and all orders for this visit:    Essential hypertension, benign:  History of HTN  BP in office today 124/80  Well controlled on current regimen and therefore will continue at this time  · Continue current home regimen;  · Metoprolol succinate 100 mg daily  · Hydrochlorothiazide 25 mg BID  · Losartan 100 mg daily     Obesity, Class III, BMI 40-49 9 (morbid obesity) ([facility])  Wt 123 kg (272 lbs)  BMI 44 57  Unchanged from visit 6 months ago  Patient admits to no changes to diet regimen and admits that her knees limit her ability to walk or exercise  Patient educated on the importance of weight loss and need for lifestyle modifications including diet and exercise  · Following lab work ordered this visit and to be completed prior to next visit:   · Lipid panel; Future  · Comprehensive metabolic panel; Future  · TSH, 3rd generation with Free T4 reflex; Future  · CBC and differential; Future  · Glucose, fasting; Future    Hypertriglyceridemia:   History of hypertriglyceridemia with most recent lipid panel completed 6/2021 with a TG level of 194  Lipid panel otherwise within normal limits  · Continue Omega-3 Fatty acids every other day  · Advised the importance of lifestyle modifications including diet and exercise  · Repeat lipid panel ordered and to be completed prior to next visit       Schedule a follow-up appointment in 6 months for continued follow up of chronic medical conditions  CHIEF COMPLAINT: 6 month follow up     HISTORY OF PRESENT ILLNESS:  Ms Darrell Sanford is a very pleasant 62year old female with PMHx of HTN, chronic venous insufficiency, OA, HLD, and obesity who presents to the office today, 1/24/2022, for 6 month follow up  Patient asymptomatic and with no complaints today  Patient admits to compliance with current home regimen including her anti-hypertensive medications  Previous lab work reviewed with patient   Diet and exercise continues to be an issue for patient  She admits to no changes in her diet  Additionally, she states that she does not participate in physical activity  She has significant OA in her bilateral knees which limit her walking ability  She has been seen by orthopedics and has received steroid and synvisc injections with no relief  She has been evaluated for bilateral knee replacements but has been told she must lose weight first  She has been seen and evaluated by weight management but explains that she did not find it helpful and was too far distance from her home making it very inconvenient for herself  The following portions of the patient's history were reviewed and updated as appropriate: allergies, current medications, past family history, past medical history, past social history, past surgical history and problem list     Review of Systems   Constitutional: Negative for activity change, appetite change, fatigue and unexpected weight change  Eyes: Negative for visual disturbance  Respiratory: Negative for cough, shortness of breath and wheezing  Cardiovascular: Negative for chest pain, palpitations and leg swelling  Gastrointestinal: Negative for abdominal pain, constipation, diarrhea, nausea and vomiting  Endocrine: Negative for polydipsia, polyphagia and polyuria  Genitourinary: Negative for frequency  Musculoskeletal: Positive for arthralgias and gait problem  Skin: Negative for color change and pallor  Neurological: Negative for dizziness, weakness, light-headedness and headaches  Psychiatric/Behavioral: Negative for agitation and behavioral problems  OBJECTIVE:  Vitals:    01/24/22 1108   BP: 124/80   BP Location: Left arm   Patient Position: Sitting   Cuff Size: Large   Pulse: 72   Temp: 98 2 °F (36 8 °C)   TempSrc: Temporal   SpO2: 99%   Weight: 123 kg (272 lb)   Height: 5' 5 5" (1 664 m)     Physical Exam  Constitutional:       General: She is not in acute distress       Appearance: Normal appearance  She is obese  She is not ill-appearing or diaphoretic  HENT:      Head: Normocephalic and atraumatic  Nose: Nose normal  No congestion  Mouth/Throat:      Mouth: Mucous membranes are moist       Pharynx: Oropharynx is clear  No oropharyngeal exudate  Eyes:      General: No scleral icterus  Conjunctiva/sclera: Conjunctivae normal    Neck:      Comments: Enlarged thyroid - nontender to palpation; symmetrical with no nodules  Cardiovascular:      Rate and Rhythm: Normal rate and regular rhythm  Pulses: Normal pulses  Heart sounds: Normal heart sounds  Pulmonary:      Effort: Pulmonary effort is normal  No respiratory distress  Breath sounds: Normal breath sounds  No wheezing  Abdominal:      General: Abdomen is flat  Bowel sounds are normal       Palpations: Abdomen is soft  Tenderness: There is no abdominal tenderness  Musculoskeletal:      Cervical back: Normal range of motion  No tenderness  Right lower leg: Edema present  Left lower leg: Edema present  Comments: Trace bilateral lower extremity edema   Skin:     General: Skin is warm  Capillary Refill: Capillary refill takes less than 2 seconds  Coloration: Skin is not pale  Neurological:      Mental Status: She is alert and oriented to person, place, and time  Motor: No weakness  Gait: Gait abnormal (antalgic gait)     Psychiatric:         Mood and Affect: Mood normal          Behavior: Behavior normal            Current Outpatient Medications:     aspirin (ECOTRIN LOW STRENGTH) 81 mg EC tablet, Take 1 tablet (81 mg total) by mouth daily, Disp: 90 tablet, Rfl: 1    calcium citrate-vitamin D (CITRACAL+D) 315-200 MG-UNIT per tablet, Take 1 tablet by mouth Every other day, Disp: , Rfl:     hydrochlorothiazide (HYDRODIURIL) 25 mg tablet, Take 1 tablet (25 mg total) by mouth 2 (two) times a day, Disp: 180 tablet, Rfl: 1    losartan (COZAAR) 100 MG tablet, Take 1 tablet (100 mg total) by mouth daily, Disp: 90 tablet, Rfl: 1    metoprolol succinate (TOPROL-XL) 100 mg 24 hr tablet, Take 1 tablet (100 mg total) by mouth daily, Disp: 90 tablet, Rfl: 1    Multiple Vitamins-Minerals (CENTRUM SILVER) tablet, Take 1 tablet by mouth daily, Disp: , Rfl:     Multiple Vitamins-Minerals (ZINC PO), Take by mouth as needed, Disp: , Rfl:     Omega-3 Fatty Acids (Fish Oil) 1200 MG CAPS, Take 1 capsule by mouth every other day , Disp: , Rfl:     Past Medical History:   Diagnosis Date    Allergic     Anemia     Enlarged thyroid     GERD (gastroesophageal reflux disease)     Hypertension     Obesity     Osteoarthritis of both knees 2018     Past Surgical History:   Procedure Laterality Date    ELBOW SURGERY      Boken no surgery    ENDOMETRIAL ABLATION      WISDOM TOOTH EXTRACTION       Social History     Socioeconomic History    Marital status: /Civil Union     Spouse name: Not on file    Number of children: Not on file    Years of education: Not on file    Highest education level: Not on file   Occupational History    Not on file   Tobacco Use    Smoking status: Former Smoker     Packs/day: 0 25     Years: 10 00     Pack years: 2 50     Types: Cigarettes     Quit date: 1989     Years since quittin 0    Smokeless tobacco: Never Used   Vaping Use    Vaping Use: Never used   Substance and Sexual Activity    Alcohol use: Yes     Comment: occasional     Drug use: No    Sexual activity: Not Currently   Other Topics Concern    Not on file   Social History Narrative    Not on file     Social Determinants of Health     Financial Resource Strain: Not on file   Food Insecurity: Not on file   Transportation Needs: Not on file   Physical Activity: Not on file   Stress: Not on file   Social Connections: Not on file   Intimate Partner Violence: Not on file   Housing Stability: Not on file     Family History   Problem Relation Age of Onset    Hypertension Mother benign essential     Heart attack Mother         recent    Carleene Martinet Hypertension Father         benign essential     Rheum arthritis Father     Breast cancer Paternal Grandmother 80    No Known Problems Sister     Stroke Daughter         as infant    Osteoarthritis Maternal Grandmother     No Known Problems Maternal Grandfather     No Known Problems Paternal Grandfather     Cervical cancer Maternal Aunt 66    Colon cancer Maternal Aunt 61    No Known Problems Paternal Aunt     No Known Problems Sister        ==  Jerrica Manrique DO  [facility] Internal Medicine PGY-2

## 2022-03-15 ENCOUNTER — TELEPHONE (OUTPATIENT)
Dept: INTERNAL MEDICINE CLINIC | Age: 62
End: 2022-03-15

## 2022-03-15 NOTE — TELEPHONE ENCOUNTER
Patient was in with her  when she mentioned to Dr Altagracia Verma that she would need an exemption  for her not to do the test they do at [employer]  This requires her to do a test that jumps in and out of the back of the bus  To carry 50 or 100 lbs sack across the bus or outside  Patient states to Dr Altagracia Verma that she has very bad knees and has been for awhile  She states that he did this once before for her  I seen that we did one back in 2019 for her  Can we have this completed and call the patient when completed      Patient will be up here this Friday in the morning to pick this up

## 2022-07-06 ENCOUNTER — CONSULT (OUTPATIENT)
Dept: INTERNAL MEDICINE CLINIC | Facility: OTHER | Age: 62
End: 2022-07-06
Payer: COMMERCIAL

## 2022-07-06 VITALS
WEIGHT: 282 LBS | DIASTOLIC BLOOD PRESSURE: 72 MMHG | SYSTOLIC BLOOD PRESSURE: 130 MMHG | BODY MASS INDEX: 45.32 KG/M2 | HEART RATE: 82 BPM | TEMPERATURE: 98.4 F | HEIGHT: 66 IN | OXYGEN SATURATION: 98 %

## 2022-07-06 DIAGNOSIS — E78.1 HYPERTRIGLYCERIDEMIA: ICD-10-CM

## 2022-07-06 DIAGNOSIS — Z01.818 PREOP EXAM FOR INTERNAL MEDICINE: Primary | ICD-10-CM

## 2022-07-06 DIAGNOSIS — E66.01 OBESITY, CLASS III, BMI 40-49.9 (MORBID OBESITY) (HCC): ICD-10-CM

## 2022-07-06 DIAGNOSIS — I10 BENIGN ESSENTIAL HTN: ICD-10-CM

## 2022-07-06 DIAGNOSIS — M17.12 PRIMARY OSTEOARTHRITIS OF LEFT KNEE: ICD-10-CM

## 2022-07-06 PROCEDURE — 99242 OFF/OP CONSLTJ NEW/EST SF 20: CPT | Performed by: NURSE PRACTITIONER

## 2022-07-06 NOTE — PROGRESS NOTES
Subjective:    Erick Louise is a 62y o  year old female who presents to the office today for a preoperative consultation at the request of surgeon Dr Ashlie Payne who plans on performing TKA of the left knee on July 11, 2022  Planned anesthesia: spinal  The patient has the following known anesthesia issues: no known personal or family hx of issues with anestesia  Patients bleeding risk: no recent abnormal bleeding  Patient is able to walk 4 blocks without symptoms  Patient is able to walk up 2 flights of stairs without symptoms  Significant past medical history includes:    HTN - controlled on current regimen of HCTZ 50mg, losartan 100mg and Toprol XL 100mg daily  Obesity - BMI 46 21    She denies any hx of  arrythmia, CAD, CAD with hx MI, CAD with recent PCI, CHF, chronic liver disease, acute hepatitis, coagulation delay, primary hypercoagulable state, pulmonary embolism, DVT, use of anticoagulants, diabetes, insulin use, thyroid disease, seizure disorder, CVA, asthma, COPD, CRYSTAL, renal disease    Tobacco use: none  Alcohol use: none  Illicit drug use: none     Symptoms:   Easy bleeding: no  Easy bruising: no  Frequent nose bleeds: no  Chest pain: no  Cough: no  Dyspnea on exertion:no  Edema: no  Palpitations: no  Wheezing: no    Living situation: Her  will be caring for her after surgery  shedoes not have post-op concerns  The following portions of the patient's history were reviewed and updated as appropriate: allergies, current medications, past family history, past medical history, past social history, past surgical history and problem list     Review of Systems  Review of Systems   Constitutional: Negative for activity change, appetite change, chills, diaphoresis, fatigue, fever and unexpected weight change  Respiratory: Negative for cough, chest tightness, shortness of breath and wheezing  Cardiovascular: Negative for chest pain, palpitations and leg swelling     Gastrointestinal: Negative for abdominal distention, abdominal pain, blood in stool, constipation, diarrhea, nausea and vomiting  Genitourinary: Negative for difficulty urinating, dysuria, frequency, hematuria and urgency  Skin: Negative for rash  Neurological: Negative for dizziness, seizures, syncope, light-headedness and headaches  Hematological: Does not bruise/bleed easily           Past Medical History:   Diagnosis Date    Allergic     Anemia     Enlarged thyroid     GERD (gastroesophageal reflux disease)     Hypertension     Obesity     Osteoarthritis of both knees 2018     Past Surgical History:   Procedure Laterality Date    ELBOW SURGERY      Boken no surgery    ENDOMETRIAL ABLATION      WISDOM TOOTH EXTRACTION       Social History     Tobacco Use    Smoking status: Former Smoker     Packs/day: 0 25     Years: 10 00     Pack years: 2 50     Types: Cigarettes     Quit date: 1989     Years since quittin 4    Smokeless tobacco: Never Used   Vaping Use    Vaping Use: Never used   Substance Use Topics    Alcohol use: Yes     Comment: occasional     Drug use: No     Family History   Problem Relation Age of Onset    Hypertension Mother         benign essential     Heart attack Mother         recent    Debbie Riis Hypertension Father         benign essential     Rheum arthritis Father     Breast cancer Paternal Grandmother 80    No Known Problems Sister     Stroke Daughter         as infant    Osteoarthritis Maternal Grandmother     No Known Problems Maternal Grandfather     No Known Problems Paternal Grandfather     Cervical cancer Maternal Aunt 66    Colon cancer Maternal Aunt 61    No Known Problems Paternal Aunt     No Known Problems Sister      Morphine, Percocet [oxycodone-acetaminophen], and Hydrocodone-acetaminophen  Current Outpatient Medications   Medication Sig Dispense Refill    hydrochlorothiazide (HYDRODIURIL) 50 mg tablet Take 1 tablet (50 mg total) by mouth daily 90 tablet 1  losartan (COZAAR) 100 MG tablet Take 1 tablet (100 mg total) by mouth daily 90 tablet 1    metoprolol succinate (TOPROL-XL) 100 mg 24 hr tablet Take 1 tablet (100 mg total) by mouth daily 90 tablet 1    Multiple Vitamins-Minerals (CENTRUM SILVER) tablet Take 1 tablet by mouth daily      Multiple Vitamins-Minerals (ZINC PO) Take by mouth as needed      aspirin (ECOTRIN LOW STRENGTH) 81 mg EC tablet Take 1 tablet (81 mg total) by mouth daily (Patient not taking: Reported on 7/6/2022) 90 tablet 1    calcium citrate-vitamin D (CITRACAL+D) 315-200 MG-UNIT per tablet Take 1 tablet by mouth Every other day (Patient not taking: Reported on 7/6/2022)      Omega-3 Fatty Acids (Fish Oil) 1200 MG CAPS Take 1 capsule by mouth every other day  (Patient not taking: Reported on 7/6/2022)       No current facility-administered medications for this visit  Objective:       /72 (BP Location: Left arm, Patient Position: Sitting, Cuff Size: Large)   Pulse 82   Temp 98 4 °F (36 9 °C) (Temporal)   Ht 5' 5 5" (1 664 m)   Wt 128 kg (282 lb)   SpO2 98%   BMI 46 21 kg/m²   Physical Exam  Constitutional:       General: She is not in acute distress  Appearance: Normal appearance  She is obese  She is not diaphoretic  HENT:      Head: Normocephalic and atraumatic  Right Ear: Tympanic membrane and external ear normal       Left Ear: Tympanic membrane and external ear normal       Nose: Nose normal  No congestion or rhinorrhea  Mouth/Throat:      Mouth: Mucous membranes are moist       Pharynx: Oropharynx is clear  No oropharyngeal exudate or posterior oropharyngeal erythema  Eyes:      Extraocular Movements: Extraocular movements intact  Conjunctiva/sclera: Conjunctivae normal       Pupils: Pupils are equal, round, and reactive to light  Neck:      Vascular: No carotid bruit  Cardiovascular:      Rate and Rhythm: Normal rate and regular rhythm  Heart sounds: Normal heart sounds   No murmur heard   Pulmonary:      Effort: Pulmonary effort is normal  No respiratory distress  Breath sounds: Normal breath sounds  No wheezing, rhonchi or rales  Abdominal:      General: Bowel sounds are normal  There is no distension  Palpations: Abdomen is soft  Tenderness: There is no abdominal tenderness  Musculoskeletal:      Right lower leg: No edema  Left lower leg: No edema  Neurological:      Mental Status: She is alert and oriented to person, place, and time  Mental status is at baseline  Psychiatric:         Mood and Affect: Mood normal          Behavior: Behavior normal           Cardiographics  ECG:  EKG reviewed through care everywhere 06/30/2022  "Sinus rhythm with 1st degree AV block  Otherwise normal ECG  When compared with ECG of 03-MAR-2009 11:29,   No significant change was found"      Lab Review   Labs reviewed from 06/30/2022  CBC within normal limits, hemoglobin 13 1  CMP elevated fasting glucose at 104  Creatinine 0 65,   INR 1 0  PTT 37 7  UA normal, culture showed mixed bacterial growth    Assessment:     62 y o  female with planned surgery as above  Known risk factors for perioperative complications: morbid obesity      Jodyed Mackey Marilou is cleared from a medical standpoint to proceed with surgery  Plan:  Preoperative workup as follows: none  Change in medication regimen before surgery: none, continue medication regimen including morning of surgery, with sip of water  She has already discontinued her fish oil and ASA  Advised to stop all other supplements as well including multivitamin        Assessment/Plan:    Problem List Items Addressed This Visit        Cardiovascular and Mediastinum    Benign essential HTN     - controlled on current regimen of Toprol  mg daily, losartan 100 mg daily and hydrochlorothiazide 50 mg daily              Musculoskeletal and Integument    Primary osteoarthritis of left knee     - scheduled for left TKA on 07/11/2022 with Dr Riya Clemens               Other    Hypertriglyceridemia     - currently on fishoil which she is holding for surgery  - follow up with PCP with updated lipid panel after surgery            Obesity, Class III, BMI 40-49 9 (morbid obesity) ([address] )     - limited exercise due to severe knee pain  - encouraged healthy diet              Other Visit Diagnoses     Preop exam for internal medicine    -  Primary    medically optimized for proposed surgery         BMI Counseling: Body mass index is 46 21 kg/m²  The BMI is above normal  Nutrition recommendations include 3-5 servings of fruits/vegetables daily, moderation in carbohydrate intake, increasing intake of lean protein and reducing intake of saturated fat and trans fat  Exercise recommendations include exercising 3-5 times per week

## 2022-07-06 NOTE — ASSESSMENT & PLAN NOTE
- controlled on current regimen of Toprol  mg daily, losartan 100 mg daily and hydrochlorothiazide 50 mg daily

## 2022-07-06 NOTE — ASSESSMENT & PLAN NOTE
- currently on fishoil which she is holding for surgery  - follow up with PCP with updated lipid panel after surgery

## 2022-07-12 ENCOUNTER — RA CDI HCC (OUTPATIENT)
Dept: OTHER | Facility: HOSPITAL | Age: 62
End: 2022-07-12

## 2022-07-12 NOTE — PROGRESS NOTES
[facility]  coding opportunities       Chart reviewed, no opportunity found: CHART REVIEWED, NO OPPORTUNITY FOUND        Patients Insurance        Commercial Insurance: [address] [place] [address]

## 2022-07-20 ENCOUNTER — OFFICE VISIT (OUTPATIENT)
Dept: INTERNAL MEDICINE CLINIC | Age: 62
End: 2022-07-20
Payer: COMMERCIAL

## 2022-07-20 VITALS
TEMPERATURE: 98.4 F | HEIGHT: 66 IN | OXYGEN SATURATION: 99 % | SYSTOLIC BLOOD PRESSURE: 128 MMHG | WEIGHT: 278 LBS | BODY MASS INDEX: 44.68 KG/M2 | DIASTOLIC BLOOD PRESSURE: 70 MMHG | HEART RATE: 78 BPM

## 2022-07-20 DIAGNOSIS — M17.12 PRIMARY OSTEOARTHRITIS OF LEFT KNEE: ICD-10-CM

## 2022-07-20 DIAGNOSIS — I10 BENIGN ESSENTIAL HTN: ICD-10-CM

## 2022-07-20 DIAGNOSIS — R73.09 ELEVATED RANDOM BLOOD GLUCOSE LEVEL: Primary | ICD-10-CM

## 2022-07-20 PROCEDURE — 99214 OFFICE O/P EST MOD 30 MIN: CPT | Performed by: STUDENT IN AN ORGANIZED HEALTH CARE EDUCATION/TRAINING PROGRAM

## 2022-07-20 RX ORDER — ACETAMINOPHEN AND CODEINE PHOSPHATE 300; 30 MG/1; MG/1
TABLET ORAL
COMMUNITY
Start: 2022-07-19

## 2022-07-20 RX ORDER — ONDANSETRON 8 MG/1
TABLET, ORALLY DISINTEGRATING ORAL
COMMUNITY
Start: 2022-07-12

## 2022-07-20 RX ORDER — AMOXICILLIN 250 MG
1 CAPSULE ORAL 2 TIMES DAILY
COMMUNITY
Start: 2022-07-12 | End: 2022-07-22

## 2022-07-20 NOTE — PROGRESS NOTES
[facility] PRIMARY CARE [facility]:  ASSESSMENT/PLAN:  Diagnoses and all orders for this visit:    Elevated random blood glucose level  · Patient noted to have elevated nonfasting blood glucose levels of 159 on recent blood work  · No prior history of diabetes; most recent hemoglobin A1c 5 4 12/2020  · Lab work previously ordered but not completed  Patient to have blood work completed prior to next visit in 6 months  · Obtain fasting glucose, CBC, TSH, CMP, lipid panel, and A1c    Benign essential HTN  · History of hypertension, well controlled at this time  · BP in office today 128/70  · Will continue on current home regimen at this time  · Hydrochlorothiazide 50 mg daily  · Losartan 100 mg daily  · Metoprolol succinate 100 mg daily    Primary osteoarthritis of left knee  · Status post left total knee arthroplasty completed at [facility] on 07/11/2022  · Patient ambulating with walker; pain controlled at this time  · Plan for eventual replacement of right knee later this year, date to be determined  · Advise continued lifestyle modifications for weight loss including diet and exercise  · Continue routine follow-up with physical therapy and orthopedics    Schedule a follow-up appointment in 6 months  CHIEF COMPLAINT: 6 month follow up     HISTORY OF PRESENT ILLNESS:  Ms Stevenson Ramos is a 11year-old female with past medical history of hypertension, chronic venous insufficiency, oa, hyperlipidemia, and obesity who presents to the office today, 07/20/2022 for routine follow-up  Patient states that she is doing well overall  Largely asymptomatic and with no complaints patient recently had a left knee arthroplasty completed on 07/11/2022  Procedure completed without complication and patient doing well overall  Pain-free at this time and patient continues to work with physical therapy  Ambulating with a walker without difficulty  Patient offers no additional complaints today    However, patient did not have blood work completed that was ordered at last visit on 01/24/2022  Patient agreeable to having blood work completed prior to next visit  The following portions of the patient's history were reviewed and updated as appropriate: allergies, current medications, past family history, past medical history, past social history, past surgical history and problem list     Review of Systems   Constitutional: Negative for activity change, appetite change, chills, fatigue and fever  Eyes: Negative for visual disturbance  Respiratory: Negative for cough, shortness of breath and wheezing  Cardiovascular: Negative for chest pain, palpitations and leg swelling  Gastrointestinal: Negative for constipation, diarrhea, nausea and vomiting  Endocrine: Negative for polydipsia, polyphagia and polyuria  Genitourinary: Negative for difficulty urinating  Musculoskeletal: Positive for arthralgias  Negative for back pain and gait problem  Skin: Negative for color change and pallor  Neurological: Negative for dizziness, weakness, light-headedness and headaches  Psychiatric/Behavioral: Negative for agitation and confusion  OBJECTIVE:  Vitals:    07/20/22 1526   BP: 128/70   BP Location: Left arm   Patient Position: Sitting   Cuff Size: Large   Pulse: 78   Temp: 98 4 °F (36 9 °C)   TempSrc: Temporal   SpO2: 99%   Weight: 126 kg (278 lb)   Height: 5' 5 5" (1 664 m)     Physical Exam  Constitutional:       General: She is not in acute distress  Appearance: She is obese  She is not ill-appearing or toxic-appearing  HENT:      Head: Normocephalic and atraumatic  Nose: Nose normal  No congestion  Mouth/Throat:      Mouth: Mucous membranes are moist       Pharynx: Oropharynx is clear  No oropharyngeal exudate  Eyes:      General: No scleral icterus  Conjunctiva/sclera: Conjunctivae normal    Cardiovascular:      Rate and Rhythm: Normal rate and regular rhythm  Pulses: Normal pulses        Heart sounds: Normal heart sounds  No murmur heard  No gallop  Pulmonary:      Effort: Pulmonary effort is normal  No respiratory distress  Breath sounds: Normal breath sounds  No wheezing, rhonchi or rales  Abdominal:      General: Abdomen is flat  Bowel sounds are normal  There is no distension  Palpations: Abdomen is soft  Tenderness: There is no abdominal tenderness  There is no guarding  Hernia: No hernia is present  Musculoskeletal:         General: No tenderness  Cervical back: Normal range of motion  Comments: Status post left knee arthroplasty; bandage in place - no signs of drainage   Skin:     General: Skin is warm  Capillary Refill: Capillary refill takes less than 2 seconds  Coloration: Skin is not pale  Findings: No lesion or rash  Neurological:      Mental Status: She is alert and oriented to person, place, and time  Motor: No weakness  Psychiatric:         Mood and Affect: Mood normal          Behavior: Behavior normal          Current Outpatient Medications:     acetaminophen-codeine (TYLENOL with CODEINE #3) 300-30 MG per tablet, TAKE 1 TABLET FOR MODERATE PAIN  (4-6/10) OR 2 TABLETS FOR SEVERE  PAIN (7-10/10)    EVERY 6 HOURS AS NEEDED FOLLOWING LEFT KNEE REPLACEMENT, Disp: , Rfl:     aspirin (ECOTRIN LOW STRENGTH) 81 mg EC tablet, Take 1 tablet (81 mg total) by mouth daily, Disp: 90 tablet, Rfl: 1    hydrochlorothiazide (HYDRODIURIL) 50 mg tablet, Take 1 tablet (50 mg total) by mouth daily, Disp: 90 tablet, Rfl: 1    losartan (COZAAR) 100 MG tablet, Take 1 tablet (100 mg total) by mouth daily, Disp: 90 tablet, Rfl: 1    metoprolol succinate (TOPROL-XL) 100 mg 24 hr tablet, Take 1 tablet (100 mg total) by mouth daily, Disp: 90 tablet, Rfl: 1    Multiple Vitamins-Minerals (CENTRUM SILVER) tablet, Take 1 tablet by mouth daily, Disp: , Rfl:     ondansetron (ZOFRAN-ODT) 8 mg disintegrating tablet, DISSOLVE ONE TABLET BY MOUTH EVERY 8 HOURS AS NEEDED FOR NAUSEA AND VOMITING, Disp: , Rfl:     senna-docusate sodium (SENOKOT S) 8 6-50 mg per tablet, Take 1 tablet by mouth 2 (two) times a day, Disp: , Rfl:     calcium citrate-vitamin D (CITRACAL+D) 315-200 MG-UNIT per tablet, Take 1 tablet by mouth Every other day (Patient not taking: No sig reported), Disp: , Rfl:     Multiple Vitamins-Minerals (ZINC PO), Take by mouth as needed (Patient not taking: Reported on 2022), Disp: , Rfl:     Omega-3 Fatty Acids (Fish Oil) 1200 MG CAPS, Take 1 capsule by mouth every other day  (Patient not taking: No sig reported), Disp: , Rfl:     Past Medical History:   Diagnosis Date    Allergic     Anemia     Enlarged thyroid     GERD (gastroesophageal reflux disease)     Hypertension     Obesity     Osteoarthritis of both knees 2018     Past Surgical History:   Procedure Laterality Date    ELBOW SURGERY      Boken no surgery    ENDOMETRIAL ABLATION      WISDOM TOOTH EXTRACTION       Social History     Socioeconomic History    Marital status: /Civil Union     Spouse name: Not on file    Number of children: Not on file    Years of education: Not on file    Highest education level: Not on file   Occupational History    Not on file   Tobacco Use    Smoking status: Former Smoker     Packs/day: 0 25     Years: 10 00     Pack years: 2 50     Types: Cigarettes     Quit date: 1989     Years since quittin 5    Smokeless tobacco: Never Used   Vaping Use    Vaping Use: Never used   Substance and Sexual Activity    Alcohol use: Yes     Comment: occasional     Drug use: No    Sexual activity: Not Currently   Other Topics Concern    Not on file   Social History Narrative    Not on file     Social Determinants of Health     Financial Resource Strain: Not on file   Food Insecurity: Not on file   Transportation Needs: Not on file   Physical Activity: Not on file   Stress: Not on file   Social Connections: Not on file   Intimate Partner Violence: Not on file   Housing Stability: Not on file     Family History   Problem Relation Age of Onset    Hypertension Mother         benign essential     Heart attack Mother         recent    Debbie Riis Hypertension Father         benign essential     Rheum arthritis Father     Breast cancer Paternal Grandmother 80    No Known Problems Sister     Stroke Daughter         as infant    Osteoarthritis Maternal Grandmother     No Known Problems Maternal Grandfather     No Known Problems Paternal Grandfather     Cervical cancer Maternal Aunt 66    Colon cancer Maternal Aunt 61    No Known Problems Paternal Aunt     No Known Problems Sister        ==  Jerrica Manrique, DO [facility] PGY-3

## 2022-08-23 ENCOUNTER — HOSPITAL ENCOUNTER (OUTPATIENT)
Dept: RADIOLOGY | Facility: IMAGING CENTER | Age: 62
Discharge: HOME | End: 2022-08-23
Payer: COMMERCIAL

## 2022-08-23 VITALS — BODY MASS INDEX: 43.07 KG/M2 | HEIGHT: 66 IN | WEIGHT: 268 LBS

## 2022-08-23 DIAGNOSIS — Z12.31 SCREENING MAMMOGRAM, ENCOUNTER FOR: ICD-10-CM

## 2022-08-23 PROCEDURE — 77067 SCR MAMMO BI INCL CAD: CPT

## 2022-08-23 PROCEDURE — 77063 BREAST TOMOSYNTHESIS BI: CPT

## 2022-09-15 DIAGNOSIS — I10 ESSENTIAL HYPERTENSION, BENIGN: ICD-10-CM

## 2022-09-15 RX ORDER — METOPROLOL SUCCINATE 100 MG/1
100 TABLET, EXTENDED RELEASE ORAL DAILY
Qty: 90 TABLET | Refills: 1 | Status: SHIPPED | OUTPATIENT
Start: 2022-09-15

## 2022-09-15 RX ORDER — LOSARTAN POTASSIUM 100 MG/1
100 TABLET ORAL DAILY
Qty: 90 TABLET | Refills: 1 | Status: SHIPPED | OUTPATIENT
Start: 2022-09-15

## 2022-09-15 RX ORDER — HYDROCHLOROTHIAZIDE 50 MG/1
50 TABLET ORAL DAILY
Qty: 90 TABLET | Refills: 1 | Status: SHIPPED | OUTPATIENT
Start: 2022-09-15

## 2022-09-28 DIAGNOSIS — I10 ESSENTIAL HYPERTENSION, BENIGN: ICD-10-CM

## 2022-09-28 RX ORDER — METOPROLOL SUCCINATE 100 MG/1
100 TABLET, EXTENDED RELEASE ORAL DAILY
Qty: 90 TABLET | Refills: 1 | Status: CANCELLED | OUTPATIENT
Start: 2022-09-28

## 2022-09-28 RX ORDER — LOSARTAN POTASSIUM 100 MG/1
100 TABLET ORAL DAILY
Qty: 90 TABLET | Refills: 1 | Status: CANCELLED | OUTPATIENT
Start: 2022-09-28

## 2022-09-28 RX ORDER — HYDROCHLOROTHIAZIDE 50 MG/1
50 TABLET ORAL DAILY
Qty: 90 TABLET | Refills: 1 | Status: CANCELLED | OUTPATIENT
Start: 2022-09-28

## 2022-10-20 ENCOUNTER — HOSPITAL ENCOUNTER (OUTPATIENT)
Dept: MAMMOGRAPHY | Facility: CLINIC | Age: 62
End: 2022-10-20
Payer: COMMERCIAL

## 2022-10-20 VITALS — BODY MASS INDEX: 43.07 KG/M2 | HEIGHT: 66 IN | WEIGHT: 268 LBS

## 2022-10-20 DIAGNOSIS — R92.8 ABNORMAL SCREENING MAMMOGRAM: ICD-10-CM

## 2022-10-20 PROCEDURE — 76642 ULTRASOUND BREAST LIMITED: CPT

## 2022-11-17 ENCOUNTER — CONSULT (OUTPATIENT)
Dept: INTERNAL MEDICINE CLINIC | Facility: OTHER | Age: 62
End: 2022-11-17

## 2022-11-17 VITALS
DIASTOLIC BLOOD PRESSURE: 78 MMHG | SYSTOLIC BLOOD PRESSURE: 124 MMHG | HEIGHT: 66 IN | HEART RATE: 70 BPM | BODY MASS INDEX: 44.26 KG/M2 | WEIGHT: 275.4 LBS | RESPIRATION RATE: 20 BRPM | TEMPERATURE: 98.4 F | OXYGEN SATURATION: 98 %

## 2022-11-17 DIAGNOSIS — I10 BENIGN ESSENTIAL HTN: ICD-10-CM

## 2022-11-17 DIAGNOSIS — E66.01 OBESITY, CLASS III, BMI 40-49.9 (MORBID OBESITY) (HCC): ICD-10-CM

## 2022-11-17 DIAGNOSIS — E78.1 HYPERTRIGLYCERIDEMIA: ICD-10-CM

## 2022-11-17 DIAGNOSIS — M17.11 PRIMARY OSTEOARTHRITIS OF RIGHT KNEE: ICD-10-CM

## 2022-11-17 DIAGNOSIS — Z01.818 PREOP EXAM FOR INTERNAL MEDICINE: Primary | ICD-10-CM

## 2022-11-17 RX ORDER — B-COMPLEX WITH VITAMIN C
1 TABLET ORAL
COMMUNITY
End: 2022-11-17

## 2022-11-17 NOTE — PATIENT INSTRUCTIONS
-Stop ASA and multivitamin 7 days prior to surgery   -hold hydrochlorothiazide morning of surgery  - continue all other medications

## 2022-11-17 NOTE — PROGRESS NOTES
Subjective:    Jose Manuel Rodriguez is a 65y o  year old male who presents to the office today for a preoperative consultation at the request of surgeon Dr Deann Mckeon who plans on performing right total knee replacement on November 28, 2022  Planned anesthesia: spinal with regional  The patient has the following known anesthesia issues: no known personal or family history of issues with anesthesia  Patients bleeding risk: no recent abnormal bleeding  Patient is able to walk 4 blocks without symptoms  Unable to walk 4 blocks with knee pain but does not have chest pain or SOB with activity  Patient is able to walk up 2 flights of stairs without symptoms  Significant past medical history includes:    HTN - controlled on hydrochlorothiazide 50 mg daily, losartan 100 mg daily and Toprol- mg daily  She denies any history of arrythmia, CAD, CAD with hx MI, CAD with recent PCI, CHF, chronic liver disease, acute hepatitis, coagulation delay, primary hypercoagulable state, pulmonary embolism, DVT, use of anticoagulants, diabetes, insulin use, thyroid disease, seizure disorder, CVA, asthma, COPD, CRYSTAL, renal disease    Tobacco use: none  Alcohol use: occasional   Illicit drug use: none    Symptoms:   Easy bleeding: no  Easy bruising: no  Frequent nose bleeds: no  Chest pain: no  Cough: no  Dyspnea on exertion:no  Edema: no  Palpitations: no  Wheezing: no    Living situation: Her  will be caring for her after surgery  shedoes not have post-op concerns  The following portions of the patient's history were reviewed and updated as appropriate: allergies, current medications, past family history, past medical history, past social history, past surgical history and problem list     Review of Systems  Review of Systems   Constitutional: Negative for appetite change, chills and fever  HENT: Negative for congestion, rhinorrhea, sinus pain and sore throat      Respiratory: Negative for cough, chest tightness, shortness of breath and wheezing  Cardiovascular: Negative for chest pain, palpitations and leg swelling  Gastrointestinal: Negative for abdominal pain, diarrhea, nausea and vomiting  Genitourinary: Negative for difficulty urinating, dysuria, frequency and urgency  Neurological: Negative for dizziness, seizures, syncope, light-headedness and headaches           Past Medical History:   Diagnosis Date   • Allergic    • Anemia    • Enlarged thyroid    • GERD (gastroesophageal reflux disease)    • Hypertension    • Obesity    • Osteoarthritis of both knees 2018     Past Surgical History:   Procedure Laterality Date   • ELBOW SURGERY      [place] no surgery   • ENDOMETRIAL ABLATION     • WISDOM TOOTH EXTRACTION       Social History     Tobacco Use   • Smoking status: Former     Packs/day: 0 25     Years: 10 00     Pack years: 2 50     Types: Cigarettes     Quit date: 1989     Years since quittin 8   • Smokeless tobacco: Never   Vaping Use   • Vaping Use: Never used   Substance Use Topics   • Alcohol use: Yes     Comment: occasional    • Drug use: No     Family History   Problem Relation Age of Onset   • Hypertension Mother         benign essential    • Heart attack Mother         recent    • Hypertension Father         benign essential    • Rheum arthritis Father    • No Known Problems Sister    • No Known Problems Sister    • Stroke Daughter         as infant   • Osteoarthritis Maternal Grandmother    • No Known Problems Maternal Grandfather    • Breast cancer Paternal Grandmother 80   • No Known Problems Paternal Grandfather    • Cervical cancer Maternal Aunt 78   • Colon cancer Maternal Aunt 60   • No Known Problems Paternal Aunt      Morphine, Percocet [oxycodone-acetaminophen], and Hydrocodone-acetaminophen  Current Outpatient Medications   Medication Sig Dispense Refill   • aspirin (ECOTRIN LOW STRENGTH) 81 mg EC tablet Take 1 tablet (81 mg total) by mouth daily 90 tablet 1   • Calcium Carb-Cholecalciferol (CALCIUM 600/VITAMIN D PO) Take 1 tablet by mouth in the morning     • hydrochlorothiazide (HYDRODIURIL) 50 mg tablet Take 1 tablet (50 mg total) by mouth daily 90 tablet 1   • losartan (COZAAR) 100 MG tablet Take 1 tablet (100 mg total) by mouth daily 90 tablet 1   • metoprolol succinate (TOPROL-XL) 100 mg 24 hr tablet Take 1 tablet (100 mg total) by mouth daily 90 tablet 1   • Multiple Vitamins-Minerals (CENTRUM SILVER) tablet Take 1 tablet by mouth daily       No current facility-administered medications for this visit  Objective:       /78 (BP Location: Left arm, Patient Position: Sitting, Cuff Size: Large)   Pulse 70   Temp 98 4 °F (36 9 °C) (Temporal)   Resp 20   Ht 5' 5 5" (1 664 m)   Wt 125 kg (275 lb 6 4 oz)   SpO2 98%   BMI 45 13 kg/m²   Physical Exam  Vitals reviewed  Constitutional:       General: She is not in acute distress  Appearance: Normal appearance  She is obese  She is not diaphoretic  HENT:      Head: Normocephalic and atraumatic  Right Ear: Tympanic membrane and external ear normal       Left Ear: Tympanic membrane and external ear normal       Nose: Nose normal  No congestion or rhinorrhea  Mouth/Throat:      Mouth: Mucous membranes are moist       Pharynx: Oropharynx is clear  No oropharyngeal exudate or posterior oropharyngeal erythema  Eyes:      Extraocular Movements: Extraocular movements intact  Conjunctiva/sclera: Conjunctivae normal       Pupils: Pupils are equal, round, and reactive to light  Neck:      Vascular: No carotid bruit  Cardiovascular:      Rate and Rhythm: Normal rate and regular rhythm  Heart sounds: Normal heart sounds  No murmur heard  Pulmonary:      Effort: Pulmonary effort is normal  No respiratory distress  Breath sounds: Normal breath sounds  No wheezing, rhonchi or rales  Abdominal:      General: There is no distension  Palpations: Abdomen is soft  Tenderness:  There is no abdominal tenderness  Skin:     General: Skin is warm and dry  Neurological:      Mental Status: She is alert and oriented to person, place, and time  Mental status is at baseline  Psychiatric:         Mood and Affect: Mood normal          Behavior: Behavior normal          Thought Content: Thought content normal          Judgment: Judgment normal           Cardiographics  ECG:  EKG from 06/30/2022 "sinus rhythm with first-degree AV block  Otherwise normal ECG"    Lab Review   Labs reviewed from 11/12/2022  CBC within normal limits  CMP essentially normal, glucose mildly elevated 103, carbon dioxide 32  INR within normal limits 0 9  A PTT mildly elevated 36 8 - pt on ASA daily  UA within normal limits     Assessment:     65 y o  female with planned surgery as above  Known risk factors for perioperative complications: None      [facility] from a medical standpoint to proceed with surgery  Reevaluation needed if he should present with new symptoms prior to surgery  Plan:  Preoperative workup as follows: none   Change in medication regimen before surgery: discontinue ASA and multivitamin 7 days prior to surgery  hold HCTZ morning of surgery       Assessment/Plan:    Problem List Items Addressed This Visit        Cardiovascular and Mediastinum    Benign essential HTN     - controlled on current regimen of hydrochlorothiazide 50 mg daily, losartan 100 mg daily and Toprol- mg daily            Musculoskeletal and Integument    Primary osteoarthritis of right knee     - scheduled for right TKA with Dr Hernandez Faith 11/28/22              Other    Hypertriglyceridemia     - healthy diet and routine exercise  - update lipid panel          Obesity, Class III, BMI 40-49 9 (morbid obesity) ([facility] [address] )     - encourage healthy diet and routine exercise         Other Visit Diagnoses     Preop exam for internal medicine    -  Primary    - medically optimized for proposed surgery on 11/28/22

## 2022-11-17 NOTE — ASSESSMENT & PLAN NOTE
- controlled on current regimen of hydrochlorothiazide 50 mg daily, losartan 100 mg daily and Toprol- mg daily

## 2023-01-09 ENCOUNTER — RA CDI HCC (OUTPATIENT)
Dept: OTHER | Facility: HOSPITAL | Age: 63
End: 2023-01-09

## 2023-01-09 NOTE — PROGRESS NOTES
NOT on the BPA- please assess using MEAT for 2023 billing        [facility] [address]  coding opportunities          Chart Reviewed number of suggestions sent to Provider: 1     Patients Insurance        Commercial Insurance: [address] [place] [address]

## 2023-01-16 ENCOUNTER — OFFICE VISIT (OUTPATIENT)
Dept: INTERNAL MEDICINE CLINIC | Facility: OTHER | Age: 63
End: 2023-01-16

## 2023-01-16 VITALS
SYSTOLIC BLOOD PRESSURE: 110 MMHG | HEIGHT: 66 IN | OXYGEN SATURATION: 99 % | TEMPERATURE: 98.8 F | DIASTOLIC BLOOD PRESSURE: 72 MMHG | BODY MASS INDEX: 44.03 KG/M2 | WEIGHT: 274 LBS | HEART RATE: 78 BPM

## 2023-01-16 DIAGNOSIS — I10 BENIGN ESSENTIAL HTN: ICD-10-CM

## 2023-01-16 DIAGNOSIS — I87.2 CHRONIC VENOUS INSUFFICIENCY: Primary | ICD-10-CM

## 2023-01-16 DIAGNOSIS — E78.1 HYPERTRIGLYCERIDEMIA: ICD-10-CM

## 2023-01-16 DIAGNOSIS — E66.01 OBESITY, CLASS III, BMI 40-49.9 (MORBID OBESITY) (HCC): ICD-10-CM

## 2023-01-16 NOTE — PROGRESS NOTES
Assessment/Plan:     Diagnoses and all orders for this visit:    Chronic venous insufficiency  Peripheral edema but no pitting chronic venous insufficiency  Obesity, Class III, BMI 40-49 9 (morbid obesity) (HCC)    Diet exercise and weight loss again recommended  Hypertension is very well controlled       M*Modal software was used to dictate this note  It may contain errors with dictating incorrect words or incorrect spelling  Please contact the provider directly with any questions  Subjective:   Chief Complaint   Patient presents with   • Follow-up     Last labs 11/29/2022   • Abdominal Pain     Especially in the mornings since about a wk ago  This started after leaving the hospital and taking stool softeners  Patient ID: Xavier Nugent is a 65 y o  female  65 years young lady is here today for the regular follow-up she is doing well she is a status post right total knee replacement previously she had a left total knee is complaining of some pain but overall the surgical wound looks very good and the knee is little swollen but unremarkable    Patient is here today for the follow-up  Hypertension  I reviewed antihypertensive medication, patient does not have any side effects of  medications, no signs or symptoms of hypertension ,hypotension or orthostatic hypotension  Patient is compliant with medications  Blood workup related to hypertensive diagnosis reviewed  Plan is to continue with the present management  We will follow-up as a scheduled and adjust the doses of the medication as indicated  Weight is stable but is still I would recommend her to do the diet exercise and weight loss  Planing of some abdominal pain and the diarrhea associated with the eating in the morning almost 2 or 3 bowel movements in the morning treated with some pain no nausea or vomiting no blood in the stools she had a colonoscopy done in 2018 which was essentially unremarkable              The following portions of the patient's history were reviewed and updated as appropriate: allergies, current medications, past family history, past medical history, past social history, past surgical history and problem list     Review of Systems   Constitutional: Negative for chills and fatigue  HENT: Negative for congestion, ear pain, hearing loss, postnasal drip, sinus pressure, sore throat and voice change  Eyes: Negative for pain, discharge and visual disturbance  Respiratory: Negative for cough, chest tightness and shortness of breath  Cardiovascular: Negative for chest pain, palpitations and leg swelling  Gastrointestinal: Positive for abdominal pain and diarrhea  Negative for blood in stool, nausea and rectal pain  Genitourinary: Negative for difficulty urinating, dysuria and urgency  Musculoskeletal: Negative for arthralgias (Bilateral total knee replacement July the left knee and in November the right knee) and joint swelling  Skin: Negative for rash  Allergic/Immunologic: Negative for environmental allergies and food allergies  Neurological: Negative for dizziness, tremors, weakness, numbness and headaches  Hematological: Negative for adenopathy  Psychiatric/Behavioral: Negative for behavioral problems and hallucinations           Past Medical History:   Diagnosis Date   • Allergic    • Anemia    • Enlarged thyroid    • GERD (gastroesophageal reflux disease)    • Hypertension    • Obesity    • Osteoarthritis of both knees 11/26/2018         Current Outpatient Medications:   •  aspirin (ECOTRIN LOW STRENGTH) 81 mg EC tablet, Take 1 tablet (81 mg total) by mouth daily, Disp: 90 tablet, Rfl: 1  •  Calcium Carb-Cholecalciferol (CALCIUM 600/VITAMIN D PO), Take 1 tablet by mouth in the morning, Disp: , Rfl:   •  hydrochlorothiazide (HYDRODIURIL) 50 mg tablet, Take 1 tablet (50 mg total) by mouth daily, Disp: 90 tablet, Rfl: 1  •  losartan (COZAAR) 100 MG tablet, Take 1 tablet (100 mg total) by mouth daily, Disp: 90 tablet, Rfl: 1  •  metoprolol succinate (TOPROL-XL) 100 mg 24 hr tablet, Take 1 tablet (100 mg total) by mouth daily, Disp: 90 tablet, Rfl: 1  •  Multiple Vitamins-Minerals (CENTRUM SILVER) tablet, Take 1 tablet by mouth daily, Disp: , Rfl:     Allergies   Allergen Reactions   • Morphine Vomiting   • Percocet [Oxycodone-Acetaminophen] GI Intolerance   • Hydrocodone-Acetaminophen GI Intolerance       Social History   Past Surgical History:   Procedure Laterality Date   • ELBOW SURGERY      [place] no surgery   • ENDOMETRIAL ABLATION     • WISDOM TOOTH EXTRACTION       Family History   Problem Relation Age of Onset   • Hypertension Mother         benign essential    • Heart attack Mother         recent    • Hypertension Father         benign essential    • Rheum arthritis Father    • No Known Problems Sister    • No Known Problems Sister    • Stroke Daughter         as infant   • Osteoarthritis Maternal Grandmother    • No Known Problems Maternal Grandfather    • Breast cancer Paternal Grandmother 80   • No Known Problems Paternal Grandfather    • Cervical cancer Maternal Aunt 78   • Colon cancer Maternal Aunt 60   • No Known Problems Paternal Aunt        Objective:  /72 (BP Location: Left arm, Patient Position: Sitting, Cuff Size: Adult)   Pulse 78   Temp 98 8 °F (37 1 °C) (Temporal)   Ht 5' 5 5" (1 664 m)   Wt 124 kg (274 lb)   SpO2 99% Comment: ra  BMI 44 90 kg/m²        Physical Exam  Constitutional:       Appearance: She is well-developed  HENT:      Right Ear: External ear normal    Eyes:      Conjunctiva/sclera: Conjunctivae normal       Pupils: Pupils are equal, round, and reactive to light  Neck:      Thyroid: No thyromegaly  Vascular: No JVD  Cardiovascular:      Rate and Rhythm: Normal rate and regular rhythm  Heart sounds: Normal heart sounds  Pulmonary:      Breath sounds: Normal breath sounds     Abdominal:      General: Bowel sounds are normal       Palpations: Abdomen is soft  Musculoskeletal:         General: Normal range of motion  Cervical back: Normal range of motion  Lymphadenopathy:      Cervical: No cervical adenopathy  Skin:     General: Skin is dry  Neurological:      Mental Status: She is alert and oriented to person, place, and time  Deep Tendon Reflexes: Reflexes are normal and symmetric     Psychiatric:         Behavior: Behavior normal

## 2023-03-14 ENCOUNTER — TELEPHONE (OUTPATIENT)
Dept: INTERNAL MEDICINE CLINIC | Age: 63
End: 2023-03-14

## 2023-03-14 DIAGNOSIS — I10 ESSENTIAL HYPERTENSION, BENIGN: ICD-10-CM

## 2023-03-14 RX ORDER — METOPROLOL SUCCINATE 100 MG/1
100 TABLET, EXTENDED RELEASE ORAL DAILY
Qty: 90 TABLET | Refills: 1 | Status: SHIPPED | OUTPATIENT
Start: 2023-03-14

## 2023-03-14 RX ORDER — HYDROCHLOROTHIAZIDE 50 MG/1
50 TABLET ORAL DAILY
Qty: 90 TABLET | Refills: 1 | Status: SHIPPED | OUTPATIENT
Start: 2023-03-14

## 2023-03-14 RX ORDER — LOSARTAN POTASSIUM 100 MG/1
100 TABLET ORAL DAILY
Qty: 90 TABLET | Refills: 1 | Status: SHIPPED | OUTPATIENT
Start: 2023-03-14

## 2023-03-14 NOTE — TELEPHONE ENCOUNTER
Patient requesting the following medication to be refilled and sent to [facility] in [place]    HCTZ 50 mg once a day    Losartan 100 mg once a day    Metoprolol Succinate 100 mg once a day    NOV: 06/12/2023

## 2023-03-27 ENCOUNTER — OFFICE VISIT (OUTPATIENT)
Dept: INTERNAL MEDICINE CLINIC | Age: 63
End: 2023-03-27

## 2023-03-27 VITALS
WEIGHT: 271 LBS | SYSTOLIC BLOOD PRESSURE: 134 MMHG | HEART RATE: 82 BPM | OXYGEN SATURATION: 97 % | DIASTOLIC BLOOD PRESSURE: 82 MMHG | TEMPERATURE: 97.1 F | HEIGHT: 66 IN | BODY MASS INDEX: 43.55 KG/M2

## 2023-03-27 DIAGNOSIS — Z78.0 POSTMENOPAUSAL: Primary | ICD-10-CM

## 2023-03-27 DIAGNOSIS — J06.9 VIRAL UPPER RESPIRATORY TRACT INFECTION: ICD-10-CM

## 2023-03-27 DIAGNOSIS — J40 BRONCHITIS: ICD-10-CM

## 2023-03-27 RX ORDER — DEXTROMETHORPHAN HYDROBROMIDE AND PROMETHAZINE HYDROCHLORIDE 15; 6.25 MG/5ML; MG/5ML
5 SOLUTION ORAL 4 TIMES DAILY PRN
Qty: 180 ML | Refills: 1 | Status: SHIPPED | OUTPATIENT
Start: 2023-03-27

## 2023-03-27 NOTE — PROGRESS NOTES
Assessment/Plan:     Diagnoses and all orders for this visit:    Postmenopausal  -     DXA bone density spine hip and pelvis; Future    Bronchitis  -     Promethazine-DM (PHENERGAN-DM) 6 25-15 mg/5 mL oral syrup; Take 5 mL by mouth 4 (four) times a day as needed for cough    Viral upper respiratory tract infection        BMI Counseling: Body mass index is 44 41 kg/m²  The BMI is above normal  Nutrition recommendations include decreasing portion sizes, encouraging healthy choices of fruits and vegetables and moderation in carbohydrate intake  Exercise recommendations include moderate physical activity 150 minutes/week  Rationale for BMI follow-up plan is due to patient being overweight or obese  M*Modal software was used to dictate this note  It may contain errors with dictating incorrect words or incorrect spelling  Please contact the provider directly with any questions  Subjective:      Chief Complaint   Patient presents with   • Cough     Started last Thursday -  Productive cough- Pt states she has some wheezing-  Pt states her throat is scratchy, tender-  some sinus pressure, headache----- No stuffy nose, fever- tested Neg for covid today        Patient ID: Mihai Vanegas is a 65 y o  female  HPI  65 years young lady is here today for the cough  Patient complains of productive cough and sneezing  Symptoms began 4 day ago  Symptoms have been gradually worsening since that time  The cough is barky and is aggravated by nothing  Associated symptoms include: none  Patient does not have new pets  Patient does not have a history of asthma  Patient does not have a history of environmental allergens  Patient has not traveled recently  Patient does not have a history of smoking  Patient has not had a previous chest x-ray  Patient has not had a PPD done    No shortness breath  The following portions of the patient's history were reviewed and updated as appropriate: allergies, current medications, past family history, past medical history, past social history, past surgical history and problem list     Review of Systems   Constitutional: Positive for fatigue  Negative for chills  HENT: Positive for postnasal drip  Negative for congestion, ear pain, hearing loss, sinus pressure, sore throat and voice change  Eyes: Negative for pain, discharge and visual disturbance  Respiratory: Positive for cough  Negative for chest tightness and shortness of breath  Cardiovascular: Negative for chest pain, palpitations and leg swelling  Gastrointestinal: Negative for abdominal pain, blood in stool, diarrhea, nausea and rectal pain  Genitourinary: Negative for difficulty urinating, dysuria and urgency  Musculoskeletal: Negative for arthralgias and joint swelling  Skin: Negative for rash  Allergic/Immunologic: Negative for environmental allergies and food allergies  Neurological: Negative for dizziness, tremors, weakness, numbness and headaches  Hematological: Negative for adenopathy  Psychiatric/Behavioral: Negative for behavioral problems and hallucinations           Past Medical History:   Diagnosis Date   • Allergic    • Anemia    • Enlarged thyroid    • GERD (gastroesophageal reflux disease)    • Hypertension    • Obesity    • Osteoarthritis of both knees 11/26/2018         Current Outpatient Medications:   •  aspirin (ECOTRIN LOW STRENGTH) 81 mg EC tablet, Take 1 tablet (81 mg total) by mouth daily, Disp: 90 tablet, Rfl: 1  •  Calcium Carb-Cholecalciferol (CALCIUM 600/VITAMIN D PO), Take 1 tablet by mouth in the morning, Disp: , Rfl:   •  hydrochlorothiazide (HYDRODIURIL) 50 mg tablet, Take 1 tablet (50 mg total) by mouth daily, Disp: 90 tablet, Rfl: 1  •  losartan (COZAAR) 100 MG tablet, Take 1 tablet (100 mg total) by mouth daily, Disp: 90 tablet, Rfl: 1  •  metoprolol succinate (TOPROL-XL) 100 mg 24 hr tablet, Take 1 tablet (100 mg total) by mouth daily, Disp: 90 tablet, Rfl: 1  •  Multiple "Vitamins-Minerals (CENTRUM SILVER) tablet, Take 1 tablet by mouth daily, Disp: , Rfl:     Allergies   Allergen Reactions   • Morphine Vomiting   • Percocet [Oxycodone-Acetaminophen] GI Intolerance   • Hydrocodone-Acetaminophen GI Intolerance       Social History   Past Surgical History:   Procedure Laterality Date   • ELBOW SURGERY      [place] no surgery   • ENDOMETRIAL ABLATION     • WISDOM TOOTH EXTRACTION       Family History   Problem Relation Age of Onset   • Hypertension Mother         benign essential    • Heart attack Mother         recent    • Hypertension Father         benign essential    • Rheum arthritis Father    • No Known Problems Sister    • No Known Problems Sister    • Stroke Daughter         as infant   • Osteoarthritis Maternal Grandmother    • No Known Problems Maternal Grandfather    • Breast cancer Paternal Grandmother 80   • No Known Problems Paternal Grandfather    • Cervical cancer Maternal Aunt 78   • Colon cancer Maternal Aunt 60   • No Known Problems Paternal Aunt        Objective:  /82 (BP Location: Left arm, Patient Position: Sitting, Cuff Size: Large)   Pulse 82   Temp (!) 97 1 °F (36 2 °C) (Temporal)   Ht 5' 5 5\" (1 664 m)   Wt 123 kg (271 lb)   SpO2 97% Comment: room air  BMI 44 41 kg/m²        Physical Exam  Constitutional:       Appearance: She is well-developed  HENT:      Right Ear: External ear normal       Mouth/Throat:      Pharynx: Posterior oropharyngeal erythema and uvula swelling present  Eyes:      Conjunctiva/sclera: Conjunctivae normal       Pupils: Pupils are equal, round, and reactive to light  Neck:      Thyroid: No thyromegaly  Vascular: No JVD  Cardiovascular:      Rate and Rhythm: Normal rate and regular rhythm  Heart sounds: Normal heart sounds  Pulmonary:      Breath sounds: Normal breath sounds  Abdominal:      General: Bowel sounds are normal       Palpations: Abdomen is soft     Musculoskeletal:         General: Normal range " of motion  Cervical back: Normal range of motion  Lymphadenopathy:      Cervical: No cervical adenopathy  Skin:     General: Skin is dry  Neurological:      Mental Status: She is alert and oriented to person, place, and time  Deep Tendon Reflexes: Reflexes are normal and symmetric     Psychiatric:         Behavior: Behavior normal

## 2023-03-28 ENCOUNTER — TELEPHONE (OUTPATIENT)
Dept: ADMINISTRATIVE | Facility: OTHER | Age: 63
End: 2023-03-28

## 2023-03-28 NOTE — TELEPHONE ENCOUNTER
Upon review of the In Basket request we were able to locate, review, and update the patient chart as requested for Pap Smear (HPV) aka Cervical Cancer Screening  Any additional questions or concerns should be emailed to the Practice Liaisons via the appropriate education email address, please do not reply via In Basket      Thank you  Ollie Rene MA

## 2023-03-28 NOTE — TELEPHONE ENCOUNTER
----- Message from Clarissa Landon sent at 3/27/2023 12:37 PM EDT -----  Regarding: Care Gap Request  03/27/23 12:37 PM    Hello, our patient attached above has had Pap Smear (HPV) aka Cervical Cancer Screening completed/performed  Please assist in updating the patient chart by pulling the Care Everywhere (CE) document  The date of service is 10/10/22       Thank you,  Luci Sarabia  [address] [facility][note 3]

## 2023-04-28 ENCOUNTER — NURSE TRIAGE (OUTPATIENT)
Age: 63
End: 2023-04-28

## 2023-04-28 NOTE — LETTER
Clintondo Machuca  [facility] [id number]        FLEXIBLE COLONOSCOPY INSTRUCTIONS  PLEASE NOTE    AS OF JUNE 1, 2014, OUR OFFICE REQUIRES 72 HOURS NOTICE OF CANCELLATION/RESCHEDULE OF A PROCEDURE TO AVOID INCURRING A MISSED APPOINTMENT FEE  Your Colonoscopy Procedure has been scheduled at:[facility]  [address] , [facility], [address] [facility]     The Date of your Procedure is: August 18, 2023      The hospital facility will contact you the evening prior to your scheduled procedure with your arrival time  Use the bowel preparation as directed  Check with your family doctor if you are taking a blood thinner (Coumadin, Plavix, Xarelto, Pradaxa, Gingko biloba, Ginseng, Feverfew, St  Thomas's Wort)  We suggest stopping these for 3 days  Special instructions may be needed if you are taking aspirin or any aspirin-containing medication  Check with your family physician  If you are on DIABETIC MEDICATION (tablets or insulin) your doctor may make changes in your preparation  Take all medications usual unless otherwise instructed  As always, if you have any questions or concerns, feel free to call the office  Our  staff will be happy to connect you with one of the nurses to answer any questions or address any concerns you have regarding your procedure  Do not wear any jewelry the day of your procedure including wedding rings  Arrangements must be made for a responsible party to drive you home from the procedure  If you do not have a responsible family member or friend to drive you home, the procedure will not be done  Vast or a taxi is not acceptable  It is important you notify our office of any insurance changes prior to your procedure and, if necessary, supply us with referrals from your primary care physician          COLONOSCOPY PREPARATION INSTRUCTIONS    Purchase (prescription not required):  · 238 gram bottle of Miralax® (Glycolax®)  · 4 Dulcolax® (Bisacodyl) Laxative Tablets  · 64 oz  bottle of Gatorade® or your preference of a non-carbonated clear liquid - NOT RED OR PURPLE     One Day Prior to Colonoscopy Procedure  · Nothing to eat the day before your procedure, only clear liquids  · It is important that you drink plenty of clear liquids throughout the day to prevent dehydration  Clear Liquids include:  o Water/Iced Tea/Lemonade/Gatorade®/Black Coffee or tea (no milk or creamers  o Soft drinks: orange, ginger ale, cola, Pepsi®, Sprite®, 7Up®  o Mark-Aid® (lemonade or orange flavors only)  o Strained fruit juices without pulp such as apple, white grape, white cranberry  o Jell-O®, lemon, lime or orange (no fruit or toppings)  o Popsicles, [place] Ice (No Ice Cream, sherbets, or fruit bars)  o Chicken or beef bouillon/broth  DO NOT EAT OR DRINK ANYTHING RED OR PURPLE  DO NOT DRINK ANY ALCOHOLIC BEVERAGES  DIABETIC PATIENTS: Consult your physician    At 4:00 pm, take (2) Dulcolax® (Bisacodyl) Laxative Tablets  Swallow the tablets whole with an 8 oz  glass of water  At 8:00 pm, take the additional (2) Dulcolax® (Bisacodyl) Laxative Tablets with 8 oz  of water  The package may direct you not to exceed (2) tablets at any time but for the purpose of this examination, you should take (4) total     Mix the 238 gm of Miralax® in 64 oz  of Gatorade® and shake the solution until the Miralax® is dissolved  You will drink half (32 oz) of this solution this evening, beginning between 4 and 6 o'clock  Drink 8 oz glassfuls at your own pace  It may take several hours to drink the solution  Remember to stay close to toilet facilities  DAY OF COLONOSCOPY PROCEDURE    Five (5) hours before your procedure, drink the other half (32 oz) of the Miralax®/Gatorade® mixture within a two (2) hour period  This may require you to get up very early if you are scheduled for an early procedure  NOTHING IS TO BE TAKEN BY MOUTH 3 HOURS PRIOR TO PROCEDURE       If you use an inhaler, please bring it with you to your procedure

## 2023-04-28 NOTE — TELEPHONE ENCOUNTER
Patient calling to schedule recall colonoscopy with Dr Kayce Franco  Verified information  Please call to schedule      7/17/18 [place] [facility] 5 yr recall, mild diverticulosis in sig colon, BMI 44

## 2023-05-03 NOTE — TELEPHONE ENCOUNTER
FC  7/17/2018, [facility], [place]; diverticulosis  5 yr recall  Fm hx of colon polyps  BMI 44 41  Scheduled for 8/18/2023, VICENTE WORRELL      Mailed information to pt  Additional Information   Negative: Is this a new patient under the age of 48?  Negative: Is this a patient over the age of 76 that has never had a colonoscopy?  Negative: Is this patient over the age of [de-identified] with a history of cancer and/or polyps?  Negative: Has the patient had a colonoscopy within the last year and when was it?  Negative: Does the patient have a family history of colon or rectal cancer?  Negative: Does the patient experience chest pain or shortness of breath when climbing stairs?  Negative: Does the patient require oxygen support?  Negative: Has the patient had a cardiac procedure within the last year?  Negative: Has the patient had a stroke or blood clots?  Negative:   Affirmative: Has the patient had a knee or hip replacement within the last 6 months that required antibiotic coverage prior to the procedure?  Negative: Is the patient currently on any blood thinners such as Coumadin, Plavix, Eliquis, Pradaxa, Xarelto, etc?  (Check the patient's current medication list and document reason for taking medication)    Advised Patient - Initial Assessment  1  Patient advised that our office requires 72 hours notice for a cancellation of a procedure to avoid incurring a missed appointment fee  2  Asked patient to please contact insurance company to ask how they will be processing the individual claim for the procedure  3  Advised patient that she is welcome to see the doctor in the office prior to the procedure  4  Advised patient to be at the location of the procedure at 30 minutes prior to the scheduled time      Protocols used: [facility] COLONOSCOPY SCHEDULING

## 2023-06-12 ENCOUNTER — OFFICE VISIT (OUTPATIENT)
Dept: INTERNAL MEDICINE CLINIC | Age: 63
End: 2023-06-12
Payer: COMMERCIAL

## 2023-06-12 VITALS
HEART RATE: 72 BPM | HEIGHT: 66 IN | DIASTOLIC BLOOD PRESSURE: 80 MMHG | SYSTOLIC BLOOD PRESSURE: 134 MMHG | TEMPERATURE: 97.5 F | BODY MASS INDEX: 44.2 KG/M2 | OXYGEN SATURATION: 97 % | WEIGHT: 275 LBS

## 2023-06-12 DIAGNOSIS — E66.01 OBESITY, CLASS III, BMI 40-49.9 (MORBID OBESITY) (HCC): ICD-10-CM

## 2023-06-12 DIAGNOSIS — I87.2 CHRONIC VENOUS INSUFFICIENCY: ICD-10-CM

## 2023-06-12 DIAGNOSIS — M25.561 CHRONIC PAIN OF RIGHT KNEE: ICD-10-CM

## 2023-06-12 DIAGNOSIS — I10 BENIGN ESSENTIAL HTN: Primary | ICD-10-CM

## 2023-06-12 DIAGNOSIS — R73.01 IMPAIRED FASTING BLOOD SUGAR: ICD-10-CM

## 2023-06-12 DIAGNOSIS — G89.29 CHRONIC PAIN OF RIGHT KNEE: ICD-10-CM

## 2023-06-12 PROCEDURE — 99214 OFFICE O/P EST MOD 30 MIN: CPT | Performed by: INTERNAL MEDICINE

## 2023-06-12 NOTE — PROGRESS NOTES
Assessment/Plan:     Diagnoses and all orders for this visit:    Benign essential HTN  -     CBC and differential; Future  -     Basic metabolic panel; Future  -     Lipid panel; Future  -     UA w Reflex to Microscopic w Reflex to Culture    Chronic venous insufficiency  No change no symptoms  Chronic pain of right knee  S/p bilateral total knee replacement and she is doing very well  Obesity, Class III, BMI 40-49 9 (morbid obesity) (HCC)  -     Hemoglobin A1C; Future    Impaired fasting blood sugar  -     Hemoglobin A1C; Future          Depression Screening and Follow-up Plan: Patient was screened for depression during today's encounter  They screened negative with a PHQ-2 score of 0  M*Modal software was used to dictate this note  It may contain errors with dictating incorrect words or incorrect spelling  Please contact the provider directly with any questions  Subjective:   Chief Complaint   Patient presents with   • Follow-up     4 month- no labs due        Patient ID: Freida Lynch is a 65 y o  female  This is a very pleasant 65 years young lady who is here today for the regular follow-up she is doing well no new complaints review of system is essentially unremarkable    Hypertension is reasonably good control it could be better continue with the same dose of the losartan nose side effects of the medication she is tolerating well    Discussed about the diet exercise and weight loss she is no diabetic she does not have any family history of diabetes or obesity we will follow-up in 4-month and see how things are going      Overall this young lady is doing well I will check the hemoglobin A1c next time when she comes and go from there      The following portions of the patient's history were reviewed and updated as appropriate: allergies, current medications, past family history, past medical history, past social history, past surgical history and problem list     Review of Systems   Constitutional: Negative for chills and fatigue  HENT: Negative for congestion, ear pain, hearing loss, postnasal drip, sinus pressure, sore throat and voice change  Eyes: Negative for pain, discharge and visual disturbance  Respiratory: Negative for cough, chest tightness and shortness of breath  Cardiovascular: Negative for chest pain, palpitations and leg swelling  Gastrointestinal: Negative for abdominal pain, blood in stool, diarrhea, nausea and rectal pain  Genitourinary: Negative for difficulty urinating, dysuria and urgency  Musculoskeletal: Negative for arthralgias and joint swelling  Skin: Negative for rash  Allergic/Immunologic: Negative for environmental allergies and food allergies  Neurological: Negative for dizziness, tremors, weakness, numbness and headaches  Hematological: Negative for adenopathy  Psychiatric/Behavioral: Negative for behavioral problems and hallucinations           Past Medical History:   Diagnosis Date   • Allergic    • Anemia    • Enlarged thyroid    • GERD (gastroesophageal reflux disease)    • Hypertension    • Obesity    • Osteoarthritis of both knees 11/26/2018         Current Outpatient Medications:   •  aspirin (ECOTRIN LOW STRENGTH) 81 mg EC tablet, Take 1 tablet (81 mg total) by mouth daily, Disp: 90 tablet, Rfl: 1  •  Calcium Carb-Cholecalciferol (CALCIUM 600/VITAMIN D PO), Take 1 tablet by mouth in the morning, Disp: , Rfl:   •  hydrochlorothiazide (HYDRODIURIL) 50 mg tablet, Take 1 tablet (50 mg total) by mouth daily, Disp: 90 tablet, Rfl: 1  •  losartan (COZAAR) 100 MG tablet, Take 1 tablet (100 mg total) by mouth daily, Disp: 90 tablet, Rfl: 1  •  metoprolol succinate (TOPROL-XL) 100 mg 24 hr tablet, Take 1 tablet (100 mg total) by mouth daily, Disp: 90 tablet, Rfl: 1  •  Multiple Vitamins-Minerals (CENTRUM SILVER) tablet, Take 1 tablet by mouth daily, Disp: , Rfl:   •  Promethazine-DM (PHENERGAN-DM) 6 25-15 mg/5 mL oral syrup, Take 5 mL by mouth 4 (four) "times a day as needed for cough, Disp: 180 mL, Rfl: 1    Allergies   Allergen Reactions   • Morphine Vomiting   • Percocet [Oxycodone-Acetaminophen] GI Intolerance   • Hydrocodone-Acetaminophen GI Intolerance       Social History   Past Surgical History:   Procedure Laterality Date   • ELBOW SURGERY      [place] no surgery   • ENDOMETRIAL ABLATION     • WISDOM TOOTH EXTRACTION       Family History   Problem Relation Age of Onset   • Hypertension Mother         benign essential    • Heart attack Mother         recent    • Hypertension Father         benign essential    • Rheum arthritis Father    • No Known Problems Sister    • No Known Problems Sister    • Stroke Daughter         as infant   • Osteoarthritis Maternal Grandmother    • No Known Problems Maternal Grandfather    • Breast cancer Paternal Grandmother 80   • No Known Problems Paternal Grandfather    • Cervical cancer Maternal Aunt 78   • Colon cancer Maternal Aunt 60   • No Known Problems Paternal Aunt        Objective:  /80 (BP Location: Left arm, Patient Position: Sitting, Cuff Size: Large)   Pulse 72   Temp 97 5 °F (36 4 °C) (Temporal)   Ht 5' 5 5\" (1 664 m)   Wt 125 kg (275 lb)   SpO2 97% Comment: room air  BMI 45 07 kg/m²        Physical Exam  Constitutional:       Appearance: She is well-developed  She is obese  HENT:      Right Ear: External ear normal    Eyes:      Conjunctiva/sclera: Conjunctivae normal       Pupils: Pupils are equal, round, and reactive to light  Neck:      Thyroid: No thyromegaly  Vascular: No JVD  Cardiovascular:      Rate and Rhythm: Normal rate and regular rhythm  Heart sounds: Normal heart sounds  Pulmonary:      Breath sounds: Normal breath sounds  Abdominal:      General: Bowel sounds are normal       Palpations: Abdomen is soft  Musculoskeletal:         General: Normal range of motion  Cervical back: Normal range of motion  Lymphadenopathy:      Cervical: No cervical adenopathy   " Skin:     General: Skin is dry  Neurological:      Mental Status: She is alert and oriented to person, place, and time  Deep Tendon Reflexes: Reflexes are normal and symmetric     Psychiatric:         Behavior: Behavior normal

## 2023-06-13 ENCOUNTER — HOSPITAL ENCOUNTER (OUTPATIENT)
Dept: RADIOLOGY | Facility: IMAGING CENTER | Age: 63
Discharge: HOME | End: 2023-06-13
Payer: COMMERCIAL

## 2023-06-13 DIAGNOSIS — Z78.0 POSTMENOPAUSAL: ICD-10-CM

## 2023-06-13 PROCEDURE — 77080 DXA BONE DENSITY AXIAL: CPT

## 2023-06-15 ENCOUNTER — HOSPITAL ENCOUNTER (OUTPATIENT)
Dept: MAMMOGRAPHY | Facility: CLINIC | Age: 63
End: 2023-06-15
Payer: COMMERCIAL

## 2023-06-15 DIAGNOSIS — R92.8 ABNORMAL MAMMOGRAM: ICD-10-CM

## 2023-06-15 PROCEDURE — 76642 ULTRASOUND BREAST LIMITED: CPT

## 2023-08-04 ENCOUNTER — RA CDI HCC (OUTPATIENT)
Dept: OTHER | Facility: HOSPITAL | Age: 63
End: 2023-08-04

## 2023-08-04 NOTE — PROGRESS NOTES
[address] [facility] coding opportunities       Chart reviewed, no opportunity found: CHART REVIEWED, NO OPPORTUNITY FOUND        Patients Insurance        Commercial Insurance: Jose Miguel Bowen

## 2023-08-17 ENCOUNTER — NURSE TRIAGE (OUTPATIENT)
Dept: OTHER | Facility: OTHER | Age: 63
End: 2023-08-17

## 2023-08-18 ENCOUNTER — HOSPITAL ENCOUNTER (OUTPATIENT)
Dept: GASTROENTEROLOGY | Facility: HOSPITAL | Age: 63
Setting detail: OUTPATIENT SURGERY
End: 2023-08-18
Attending: COLON & RECTAL SURGERY
Payer: COMMERCIAL

## 2023-08-18 ENCOUNTER — ANESTHESIA (OUTPATIENT)
Dept: GASTROENTEROLOGY | Facility: HOSPITAL | Age: 63
End: 2023-08-18

## 2023-08-18 ENCOUNTER — ANESTHESIA EVENT (OUTPATIENT)
Dept: GASTROENTEROLOGY | Facility: HOSPITAL | Age: 63
End: 2023-08-18

## 2023-08-18 VITALS
RESPIRATION RATE: 16 BRPM | HEIGHT: 66 IN | TEMPERATURE: 96.6 F | BODY MASS INDEX: 44.2 KG/M2 | DIASTOLIC BLOOD PRESSURE: 72 MMHG | SYSTOLIC BLOOD PRESSURE: 119 MMHG | WEIGHT: 275 LBS | OXYGEN SATURATION: 97 % | HEART RATE: 76 BPM

## 2023-08-18 DIAGNOSIS — K57.90 DIVERTICULOSIS: ICD-10-CM

## 2023-08-18 DIAGNOSIS — Z83.71 FAMILY HISTORY OF COLONIC POLYPS: ICD-10-CM

## 2023-08-18 PROCEDURE — 88305 TISSUE EXAM BY PATHOLOGIST: CPT | Performed by: PATHOLOGY

## 2023-08-18 PROCEDURE — 45385 COLONOSCOPY W/LESION REMOVAL: CPT | Performed by: COLON & RECTAL SURGERY

## 2023-08-18 RX ORDER — SODIUM CHLORIDE 9 MG/ML
INJECTION, SOLUTION INTRAVENOUS CONTINUOUS PRN
Status: DISCONTINUED | OUTPATIENT
Start: 2023-08-18 | End: 2023-08-18

## 2023-08-18 RX ORDER — PROPOFOL 10 MG/ML
INJECTION, EMULSION INTRAVENOUS CONTINUOUS PRN
Status: DISCONTINUED | OUTPATIENT
Start: 2023-08-18 | End: 2023-08-18

## 2023-08-18 RX ORDER — LIDOCAINE HYDROCHLORIDE 10 MG/ML
INJECTION, SOLUTION EPIDURAL; INFILTRATION; INTRACAUDAL; PERINEURAL AS NEEDED
Status: DISCONTINUED | OUTPATIENT
Start: 2023-08-18 | End: 2023-08-18

## 2023-08-18 RX ADMIN — SODIUM CHLORIDE: 9 INJECTION, SOLUTION INTRAVENOUS at 09:15

## 2023-08-18 RX ADMIN — LIDOCAINE HYDROCHLORIDE 5 ML: 10 INJECTION, SOLUTION EPIDURAL; INFILTRATION; INTRACAUDAL; PERINEURAL at 09:15

## 2023-08-18 RX ADMIN — PROPOFOL 120 MCG/KG/MIN: 10 INJECTION, EMULSION INTRAVENOUS at 09:15

## 2023-08-18 NOTE — H&P
History and Physical   Colon and Rectal Surgery   Jody El 65 y.o. female MRN: [id number]  Unit/Bed#:  Encounter: [id number]  08/18/23   9:04 AM      No chief complaint on file. ASSESSMENT:  Gwen Fong is a 65 y.o. female who presents for screening colonoscopy, family history of colon polyps, last 2018. Screening colonoscopy,discussed in a face-to-face, personal, informed consent process, the benefits, alternatives, risks including not limited to bleeding, missed lesion, perforation requiring emergent surgery discussed/understood. PLAN:  Colonoscopy    History of Present Illness   HPI:  Gwen Fong is a 65 y.o. female who presents screening colonoscopy. Denies nausea/vomiting/abdominal pain, change in bowel habits, rectal bleeding, or other constitutional symptoms.        Historical Information   Past Medical History:   Diagnosis Date   • Allergic    • Anemia    • Enlarged thyroid    • GERD (gastroesophageal reflux disease)    • Hypertension    • Obesity    • Osteoarthritis of both knees 11/26/2018     Past Surgical History:   Procedure Laterality Date   • ELBOW SURGERY      [place] no surgery   • ENDOMETRIAL ABLATION     • KNEE ARTHROSCOPY     • WISDOM TOOTH EXTRACTION         Meds/Allergies     (Not in a hospital admission)        Current Outpatient Medications:   •  aspirin (ECOTRIN LOW STRENGTH) 81 mg EC tablet, Take 1 tablet (81 mg total) by mouth daily, Disp: 90 tablet, Rfl: 1  •  Calcium Carb-Cholecalciferol (CALCIUM 600/VITAMIN D PO), Take 1 tablet by mouth in the morning, Disp: , Rfl:   •  hydrochlorothiazide (HYDRODIURIL) 50 mg tablet, Take 1 tablet (50 mg total) by mouth daily, Disp: 90 tablet, Rfl: 1  •  losartan (COZAAR) 100 MG tablet, Take 1 tablet (100 mg total) by mouth daily, Disp: 90 tablet, Rfl: 1  •  metoprolol succinate (TOPROL-XL) 100 mg 24 hr tablet, Take 1 tablet (100 mg total) by mouth daily, Disp: 90 tablet, Rfl: 1  •  Multiple Vitamins-Minerals (CENTRUM SILVER) tablet, Take 1 tablet by mouth daily, Disp: , Rfl:     Allergies   Allergen Reactions   • Morphine Vomiting   • Percocet [Oxycodone-Acetaminophen] GI Intolerance   • Hydrocodone-Acetaminophen GI Intolerance         Social History   Social History     Substance and Sexual Activity   Alcohol Use Yes    Comment: occasional      Social History     Substance and Sexual Activity   Drug Use No     Social History     Tobacco Use   Smoking Status Former   • Packs/day: 0.10   • Years: 10.00   • Total pack years: 1.00   • Types: Cigarettes   • Quit date: 1989   • Years since quittin.5   Smokeless Tobacco Never         Family History:   Family History   Problem Relation Age of Onset   • Hypertension Mother         benign essential    • Heart attack Mother         recent    • Hypertension Father         benign essential    • Rheum arthritis Father    • No Known Problems Sister    • No Known Problems Sister    • Stroke Daughter         as infant   • Osteoarthritis Maternal Grandmother    • No Known Problems Maternal Grandfather    • Breast cancer Paternal Grandmother 80   • No Known Problems Paternal Grandfather    • Cervical cancer Maternal Aunt 66   • Colon cancer Maternal Aunt 60   • No Known Problems Paternal Aunt          Objective     Current Vitals:   Blood Pressure: 158/76 (23)  Pulse: 80 (23)  Temperature: (!) 97.4 °F (36.3 °C) (23)  Temp Source: Tympanic (23)  Respirations: 16 (23)  Height: 5' 5.5" (166.4 cm) (23)  Weight - Scale: 125 kg (275 lb) (23)  SpO2: 97 % (23)  No intake or output data in the 24 hours ending 23    Physical Exam:  General:no distress  Eyes:perrla/eomi  ENT:moist mucus membranes  Neck:supple  Pulm:no increased work of breathing  CV:sinus  Abdomen:soft,nontender  Extremities:no edema

## 2023-08-18 NOTE — ANESTHESIA POSTPROCEDURE EVALUATION
Post-Op Assessment Note    CV Status:  Stable  Pain Score: 0    Pain management: adequate     Mental Status:  Awake and sleepy   Hydration Status:  Euvolemic   PONV Controlled:  Controlled   Airway Patency:  Patent      Post Op Vitals Reviewed: Yes      Staff: Anesthesiologist, CRNA         No notable events documented.     /66 (08/18/23 0938)    Temp (!) 96.6 °F (35.9 °C) (08/18/23 0938)    Pulse 81 (08/18/23 0938)   Resp 18 (08/18/23 0938)    SpO2 97 % (08/18/23 0938) face mask

## 2023-08-18 NOTE — TELEPHONE ENCOUNTER
Reason for Disposition  • Bowel prep for colonoscopy, questions about    Answer Assessment - Initial Assessment Questions  1. DATE/TIME: "When did you have your colonoscopy?"       8/18/23 0900  2. MAIN CONCERN: "What is your main concern right now?" "What questions do you have?"     Can I take my water pill in the morning?     Protocols used: COLONOSCOPY SYMPTOMS AND QUESTIONS-ADULT-

## 2023-08-18 NOTE — ANESTHESIA PREPROCEDURE EVALUATION
Procedure:  COLONOSCOPY    Relevant Problems   CARDIO   (+) Benign essential HTN   (+) Hypertriglyceridemia      MUSCULOSKELETAL   (+) Primary osteoarthritis of left knee   (+) Primary osteoarthritis of right knee        Physical Exam    Airway    Mallampati score: II  TM Distance: >3 FB  Neck ROM: full     Dental       Cardiovascular  Rhythm: regular, Rate: normal, No peripheral edema,     Pulmonary  Breath sounds normal,     Other Findings        Anesthesia Plan  ASA Score- 2     Anesthesia Type- IV sedation with anesthesia with ASA Monitors. Additional Monitors:   Airway Plan:           Plan Factors-    Chart reviewed. EKG reviewed. Existing labs reviewed. Patient summary reviewed. Patient is not a current smoker. Induction- intravenous. Postoperative Plan-     Informed Consent- Anesthetic plan and risks discussed with patient. I personally reviewed this patient with the CRNA. Discussed and agreed on the Anesthesia Plan with the CRNA. Robinson Tatum

## 2023-08-18 NOTE — TELEPHONE ENCOUNTER
Regarding: colon prep question  ----- Message from Nusrat Cespedes sent at 8/17/2023  8:27 PM EDT -----  "I am having a colonoscopy tomorrow I have a question about my water pill that I take in the morning.  Am I able to take before my procedure"

## 2023-08-23 PROCEDURE — 88305 TISSUE EXAM BY PATHOLOGIST: CPT | Performed by: PATHOLOGY

## 2023-09-10 DIAGNOSIS — I10 ESSENTIAL HYPERTENSION, BENIGN: ICD-10-CM

## 2023-09-11 RX ORDER — LOSARTAN POTASSIUM 100 MG/1
100 TABLET ORAL DAILY
Qty: 90 TABLET | Refills: 1 | OUTPATIENT
Start: 2023-09-11

## 2023-09-11 RX ORDER — METOPROLOL SUCCINATE 100 MG/1
100 TABLET, EXTENDED RELEASE ORAL DAILY
Qty: 90 TABLET | Refills: 1 | OUTPATIENT
Start: 2023-09-11

## 2023-09-11 RX ORDER — HYDROCHLOROTHIAZIDE 50 MG/1
50 TABLET ORAL DAILY
Qty: 90 TABLET | Refills: 1 | OUTPATIENT
Start: 2023-09-11

## 2023-09-13 DIAGNOSIS — I10 ESSENTIAL HYPERTENSION, BENIGN: ICD-10-CM

## 2023-09-13 RX ORDER — LOSARTAN POTASSIUM 100 MG/1
100 TABLET ORAL DAILY
Qty: 90 TABLET | Refills: 1 | OUTPATIENT
Start: 2023-09-13

## 2023-09-13 RX ORDER — METOPROLOL SUCCINATE 100 MG/1
100 TABLET, EXTENDED RELEASE ORAL DAILY
Qty: 90 TABLET | Refills: 1 | OUTPATIENT
Start: 2023-09-13

## 2023-09-13 RX ORDER — HYDROCHLOROTHIAZIDE 50 MG/1
50 TABLET ORAL DAILY
Qty: 90 TABLET | Refills: 1 | OUTPATIENT
Start: 2023-09-13

## 2023-09-14 DIAGNOSIS — I10 ESSENTIAL HYPERTENSION, BENIGN: ICD-10-CM

## 2023-09-14 RX ORDER — HYDROCHLOROTHIAZIDE 50 MG/1
50 TABLET ORAL DAILY
Qty: 90 TABLET | Refills: 1 | Status: SHIPPED | OUTPATIENT
Start: 2023-09-14

## 2023-09-14 RX ORDER — LOSARTAN POTASSIUM 100 MG/1
100 TABLET ORAL DAILY
Qty: 90 TABLET | Refills: 1 | Status: SHIPPED | OUTPATIENT
Start: 2023-09-14

## 2023-09-14 RX ORDER — METOPROLOL SUCCINATE 100 MG/1
100 TABLET, EXTENDED RELEASE ORAL DAILY
Qty: 90 TABLET | Refills: 1 | Status: SHIPPED | OUTPATIENT
Start: 2023-09-14

## 2023-12-26 ENCOUNTER — HOSPITAL ENCOUNTER (OUTPATIENT)
Dept: MAMMOGRAPHY | Facility: CLINIC | Age: 63
Discharge: HOME | End: 2023-12-26
Payer: COMMERCIAL

## 2023-12-26 VITALS — BODY MASS INDEX: 45.82 KG/M2 | HEIGHT: 65 IN | WEIGHT: 275 LBS

## 2023-12-26 DIAGNOSIS — R92.8 ABNORMAL MAMMOGRAM: ICD-10-CM

## 2023-12-26 PROCEDURE — 76642 ULTRASOUND BREAST LIMITED: CPT

## 2023-12-26 PROCEDURE — 77066 DX MAMMO INCL CAD BI: CPT

## 2023-12-26 PROCEDURE — G0279 TOMOSYNTHESIS, MAMMO: HCPCS

## 2024-02-19 ENCOUNTER — OFFICE VISIT (OUTPATIENT)
Dept: INTERNAL MEDICINE CLINIC | Age: 64
End: 2024-02-19
Payer: COMMERCIAL

## 2024-02-19 VITALS
TEMPERATURE: 97.7 F | DIASTOLIC BLOOD PRESSURE: 84 MMHG | WEIGHT: 283.4 LBS | HEIGHT: 65 IN | SYSTOLIC BLOOD PRESSURE: 138 MMHG | HEART RATE: 80 BPM | OXYGEN SATURATION: 97 % | BODY MASS INDEX: 47.22 KG/M2

## 2024-02-19 DIAGNOSIS — R05.8 POST-VIRAL COUGH SYNDROME: ICD-10-CM

## 2024-02-19 DIAGNOSIS — I10 BENIGN ESSENTIAL HTN: ICD-10-CM

## 2024-02-19 DIAGNOSIS — N30.00 ACUTE CYSTITIS WITHOUT HEMATURIA: Primary | ICD-10-CM

## 2024-02-19 PROBLEM — N39.0 URINARY TRACT INFECTION: Status: ACTIVE | Noted: 2024-02-19

## 2024-02-19 LAB
SL AMB  POCT GLUCOSE, UA: NORMAL
SL AMB LEUKOCYTE ESTERASE,UA: NORMAL
SL AMB POCT BILIRUBIN,UA: NORMAL
SL AMB POCT BLOOD,UA: NORMAL
SL AMB POCT CLARITY,UA: NORMAL
SL AMB POCT COLOR,UA: YELLOW
SL AMB POCT KETONES,UA: NORMAL
SL AMB POCT NITRITE,UA: POSITIVE
SL AMB POCT PH,UA: 6
SL AMB POCT SPECIFIC GRAVITY,UA: 1.02
SL AMB POCT URINE PROTEIN: 30
SL AMB POCT UROBILINOGEN: 0.2

## 2024-02-19 PROCEDURE — 87086 URINE CULTURE/COLONY COUNT: CPT

## 2024-02-19 PROCEDURE — 87186 SC STD MICRODIL/AGAR DIL: CPT

## 2024-02-19 PROCEDURE — 99213 OFFICE O/P EST LOW 20 MIN: CPT

## 2024-02-19 PROCEDURE — 81003 URINALYSIS AUTO W/O SCOPE: CPT

## 2024-02-19 PROCEDURE — 87077 CULTURE AEROBIC IDENTIFY: CPT

## 2024-02-19 RX ORDER — CEPHALEXIN 500 MG/1
500 CAPSULE ORAL 3 TIMES DAILY
Qty: 15 CAPSULE | Refills: 0 | Status: SHIPPED | OUTPATIENT
Start: 2024-02-19 | End: 2024-02-21 | Stop reason: ALTCHOICE

## 2024-02-19 RX ORDER — BENZONATATE 100 MG/1
100 CAPSULE ORAL 3 TIMES DAILY PRN
Qty: 20 CAPSULE | Refills: 0 | Status: SHIPPED | OUTPATIENT
Start: 2024-02-19

## 2024-02-19 NOTE — ASSESSMENT & PLAN NOTE
POC UA showed positive nitrates, moderate leukocytes  Will send for culture and sensitivity  Will empirically treat with Keflex 500 mg 3 times daily x 5 days  Advised to take with food, take old antibiotic course  Will call patient with results of culture and sensitivity when available  Discussed strict return precautions including calling the office if experiencing any fevers, chills, abdominal pain, blood in the urine  Advise she can take Azo as needed for pain with urination  Advise adequate fluid intake

## 2024-02-19 NOTE — ASSESSMENT & PLAN NOTE
Diagnosed with COVID 11 days ago  States all symptoms have resolved other than cough  Cough is dry, intermittent  Lungs clear to auscultation, no cough during exam  Will give Tessalon perles to be used as needed for cough  Discussed with patient red flag symptoms and when to return.  Advised cough can take additional 4 weeks to resolve following illness

## 2024-02-19 NOTE — PROGRESS NOTES
Assessment/Plan:    1. Acute cystitis without hematuria  Assessment & Plan:  POC UA showed positive nitrates, moderate leukocytes  Will send for culture and sensitivity  Will empirically treat with Keflex 500 mg 3 times daily x 5 days  Advised to take with food, take old antibiotic course  Will call patient with results of culture and sensitivity when available  Discussed strict return precautions including calling the office if experiencing any fevers, chills, abdominal pain, blood in the urine  Advise she can take Azo as needed for pain with urination  Advise adequate fluid intake    Orders:  -     POCT urine dip  -     cephalexin (KEFLEX) 500 mg capsule; Take 1 capsule (500 mg total) by mouth 3 (three) times a day for 5 days  -     Urine culture    2. Post-viral cough syndrome  Assessment & Plan:  Diagnosed with COVID 11 days ago  States all symptoms have resolved other than cough  Cough is dry, intermittent  Lungs clear to auscultation, no cough during exam  Will give Tessalon perles to be used as needed for cough  Discussed with patient red flag symptoms and when to return.  Advised cough can take additional 4 weeks to resolve following illness    Orders:  -     benzonatate (TESSALON PERLES) 100 mg capsule; Take 1 capsule (100 mg total) by mouth 3 (three) times a day as needed for cough    3. Benign essential HTN  Assessment & Plan:  Blood pressure controlled today.  Continue with current regiment                [employer] software was used to dictate this note.  It may contain errors with dictating incorrect words or incorrect spelling. Please contact the provider directly with any questions.    Subjective:      Patient ID: Jody Mendenhall is a 63 y.o. female.    Patient is a 63-year-old female presenting to the office with urinary frequency and pressure  States that her symptoms started approximately 3 weeks ago  Was taking Azo for symptoms, stated helped some in the beginning, has not been helping since  Denies  dysuria, hematuria, pelvic pain, abdominal pain, nausea, vomiting, fevers  No history of UTIs seen in chart.  Patient states that she did have bladder infections more than 30 years ago    Urinary Tract Infection   This is a new problem. The current episode started 1 to 4 weeks ago. The problem occurs every urination. The problem has been unchanged. There has been no fever. Associated symptoms include frequency and urgency. Pertinent negatives include no chills, discharge, flank pain, hematuria, hesitancy, nausea, sweats or vomiting.       The following portions of the patient's history were reviewed and updated as appropriate: allergies, current medications, past family history, past medical history, past social history, past surgical history, and problem list.    Review of Systems   Constitutional:  Negative for chills, fatigue and fever.   Gastrointestinal:  Negative for abdominal pain, constipation, diarrhea, nausea and vomiting.   Genitourinary:  Positive for frequency and urgency. Negative for difficulty urinating, dysuria, flank pain, hematuria, hesitancy and pelvic pain.   Musculoskeletal:  Negative for back pain.   All other systems reviewed and are negative.        Past Medical History:   Diagnosis Date    Allergic     Anemia     Enlarged thyroid     GERD (gastroesophageal reflux disease)     Hypertension     Obesity     Osteoarthritis of both knees 11/26/2018         Current Outpatient Medications:     aspirin (ECOTRIN LOW STRENGTH) 81 mg EC tablet, Take 1 tablet (81 mg total) by mouth daily, Disp: 90 tablet, Rfl: 1    benzonatate (TESSALON PERLES) 100 mg capsule, Take 1 capsule (100 mg total) by mouth 3 (three) times a day as needed for cough, Disp: 20 capsule, Rfl: 0    Calcium Carb-Cholecalciferol (CALCIUM 600/VITAMIN D PO), Take 1 tablet by mouth in the morning, Disp: , Rfl:     cephalexin (KEFLEX) 500 mg capsule, Take 1 capsule (500 mg total) by mouth 3 (three) times a day for 5 days, Disp: 15  "capsule, Rfl: 0    hydrochlorothiazide (HYDRODIURIL) 50 mg tablet, Take 1 tablet (50 mg total) by mouth daily, Disp: 90 tablet, Rfl: 1    losartan (COZAAR) 100 MG tablet, Take 1 tablet (100 mg total) by mouth daily, Disp: 90 tablet, Rfl: 1    metoprolol succinate (TOPROL-XL) 100 mg 24 hr tablet, Take 1 tablet (100 mg total) by mouth daily, Disp: 90 tablet, Rfl: 1    Multiple Vitamins-Minerals (CENTRUM SILVER) tablet, Take 1 tablet by mouth daily, Disp: , Rfl:     Allergies   Allergen Reactions    Morphine Vomiting    Percocet [Oxycodone-Acetaminophen] GI Intolerance    Hydrocodone-Acetaminophen GI Intolerance       Social History   Past Surgical History:   Procedure Laterality Date    COLONOSCOPY      ELBOW SURGERY      Boken no surgery    ENDOMETRIAL ABLATION      KNEE ARTHROSCOPY      WISDOM TOOTH EXTRACTION       Family History   Problem Relation Age of Onset    Hypertension Mother         benign essential     Heart attack Mother         recent     Hypertension Father         benign essential     Rheum arthritis Father     No Known Problems Sister     No Known Problems Sister     Stroke Daughter         as infant    Osteoarthritis Maternal Grandmother     No Known Problems Maternal Grandfather     Breast cancer Paternal Grandmother 85    No Known Problems Paternal Grandfather     Cervical cancer Maternal Aunt 78    Colon cancer Maternal Aunt 60    No Known Problems Paternal Aunt        Objective:  /84 (BP Location: Left arm, Patient Position: Sitting, Cuff Size: Large)   Pulse 80   Temp 97.7 °F (36.5 °C) (Temporal)   Ht 5' 5\" (1.651 m)   Wt 129 kg (283 lb 6.4 oz)   SpO2 97%   BMI 47.16 kg/m²      Physical Exam  Vitals and nursing note reviewed.   Constitutional:       General: She is not in acute distress.     Appearance: Normal appearance. She is not ill-appearing.   HENT:      Head: Normocephalic and atraumatic.      Right Ear: External ear normal.      Left Ear: External ear normal.      " Mouth/Throat:      Mouth: Mucous membranes are moist.   Eyes:      General: No scleral icterus.     Conjunctiva/sclera: Conjunctivae normal.      Pupils: Pupils are equal, round, and reactive to light.   Cardiovascular:      Rate and Rhythm: Normal rate and regular rhythm.      Heart sounds: Normal heart sounds. No murmur heard.     No friction rub. No gallop.   Pulmonary:      Effort: Pulmonary effort is normal. No respiratory distress.      Breath sounds: Normal breath sounds. No wheezing, rhonchi or rales.   Chest:      Chest wall: No tenderness.   Abdominal:      General: There is no distension.      Palpations: Abdomen is soft.      Tenderness: There is no abdominal tenderness. There is no right CVA tenderness, left CVA tenderness, guarding or rebound.   Musculoskeletal:      Right lower leg: No edema.      Left lower leg: No edema.   Skin:     General: Skin is warm and dry.      Coloration: Skin is not pale.      Findings: No erythema.   Neurological:      General: No focal deficit present.      Mental Status: She is alert and oriented to person, place, and time. Mental status is at baseline.      Cranial Nerves: No cranial nerve deficit.      Motor: No weakness.      Coordination: Coordination normal.   Psychiatric:         Mood and Affect: Mood normal.         Behavior: Behavior normal.

## 2024-02-21 ENCOUNTER — TELEPHONE (OUTPATIENT)
Dept: INTERNAL MEDICINE CLINIC | Facility: OTHER | Age: 64
End: 2024-02-21

## 2024-02-21 DIAGNOSIS — N30.00 ACUTE CYSTITIS WITHOUT HEMATURIA: Primary | ICD-10-CM

## 2024-02-21 PROBLEM — N39.0 URINARY TRACT INFECTION: Status: RESOLVED | Noted: 2024-02-19 | Resolved: 2024-02-21

## 2024-02-21 LAB — BACTERIA UR CULT: ABNORMAL

## 2024-02-21 RX ORDER — CEFUROXIME AXETIL 500 MG/1
500 TABLET ORAL EVERY 12 HOURS SCHEDULED
Qty: 10 TABLET | Refills: 0 | Status: SHIPPED | OUTPATIENT
Start: 2024-02-21 | End: 2024-02-26

## 2024-02-21 NOTE — TELEPHONE ENCOUNTER
Left message on machine for patient  Culture shows E. coli that is resistant to cefazolin  Will switch prescription to cefuroxime 500 mg twice daily x 5 days  Take with food, take full antibiotic course  Rx sent to pharmacy  Call if symptoms worsen or do not improve

## 2024-02-27 ENCOUNTER — APPOINTMENT (OUTPATIENT)
Dept: URGENT CARE | Age: 64
End: 2024-02-27

## 2024-03-08 ENCOUNTER — TELEPHONE (OUTPATIENT)
Dept: OTHER | Facility: OTHER | Age: 64
End: 2024-03-08

## 2024-03-08 DIAGNOSIS — I10 ESSENTIAL HYPERTENSION, BENIGN: ICD-10-CM

## 2024-03-08 RX ORDER — METOPROLOL SUCCINATE 100 MG/1
100 TABLET, EXTENDED RELEASE ORAL DAILY
Qty: 90 TABLET | Refills: 1 | OUTPATIENT
Start: 2024-03-08

## 2024-03-08 RX ORDER — HYDROCHLOROTHIAZIDE 50 MG/1
50 TABLET ORAL DAILY
Qty: 90 TABLET | Refills: 1 | OUTPATIENT
Start: 2024-03-08

## 2024-03-08 RX ORDER — LOSARTAN POTASSIUM 100 MG/1
100 TABLET ORAL DAILY
Qty: 90 TABLET | Refills: 1 | OUTPATIENT
Start: 2024-03-08

## 2024-03-08 NOTE — TELEPHONE ENCOUNTER
Pt has no upcoming appointment. Left a message on VM to give a call back to make an upcoming appointment.

## 2024-03-08 NOTE — TELEPHONE ENCOUNTER
Patient called today regarding she got a call that her Prescriptions can not be filled because she needs an Appointment. Patient states she was seen on February 19 2024. Please call Patient back to discuss.

## 2024-03-11 RX ORDER — HYDROCHLOROTHIAZIDE 50 MG/1
50 TABLET ORAL DAILY
Qty: 90 TABLET | Refills: 0 | Status: SHIPPED | OUTPATIENT
Start: 2024-03-11

## 2024-03-11 RX ORDER — LOSARTAN POTASSIUM 100 MG/1
100 TABLET ORAL DAILY
Qty: 90 TABLET | Refills: 1 | OUTPATIENT
Start: 2024-03-11

## 2024-03-11 RX ORDER — LOSARTAN POTASSIUM 100 MG/1
100 TABLET ORAL DAILY
Qty: 90 TABLET | Refills: 0 | Status: SHIPPED | OUTPATIENT
Start: 2024-03-11

## 2024-03-11 RX ORDER — HYDROCHLOROTHIAZIDE 50 MG/1
50 TABLET ORAL DAILY
Qty: 90 TABLET | Refills: 1 | OUTPATIENT
Start: 2024-03-11

## 2024-03-11 RX ORDER — METOPROLOL SUCCINATE 100 MG/1
100 TABLET, EXTENDED RELEASE ORAL DAILY
Qty: 90 TABLET | Refills: 0 | Status: SHIPPED | OUTPATIENT
Start: 2024-03-11

## 2024-03-11 RX ORDER — METOPROLOL SUCCINATE 100 MG/1
100 TABLET, EXTENDED RELEASE ORAL DAILY
Qty: 90 TABLET | Refills: 1 | OUTPATIENT
Start: 2024-03-11

## 2024-03-21 ENCOUNTER — TELEPHONE (OUTPATIENT)
Dept: INTERNAL MEDICINE CLINIC | Facility: OTHER | Age: 64
End: 2024-03-21

## 2024-03-21 NOTE — TELEPHONE ENCOUNTER
Hi, this is Claudia Kirkland calling birth date 1960. I was there on February 19th to see Connie Rivera and I got a bill that had three laboratory tests done at [facility] and I was wondering exactly what they were for. Give me a call back, it's [phone]. Thank you

## 2024-03-25 ENCOUNTER — RA CDI HCC (OUTPATIENT)
Dept: OTHER | Facility: HOSPITAL | Age: 64
End: 2024-03-25

## 2024-03-25 NOTE — PROGRESS NOTES
HCC coding opportunities          Chart Reviewed number of suggestions sent to Provider: 1     Patients Insurance        Commercial Insurance: [employer]

## 2024-03-28 DIAGNOSIS — I10 BENIGN ESSENTIAL HTN: ICD-10-CM

## 2024-03-28 DIAGNOSIS — I10 ESSENTIAL HYPERTENSION, BENIGN: Primary | ICD-10-CM

## 2024-03-28 DIAGNOSIS — E66.01 OBESITY, CLASS III, BMI 40-49.9 (MORBID OBESITY) (HCC): ICD-10-CM

## 2024-03-28 LAB — HBA1C MFR BLD HPLC: 5.6 %

## 2024-04-01 ENCOUNTER — OFFICE VISIT (OUTPATIENT)
Dept: INTERNAL MEDICINE CLINIC | Facility: OTHER | Age: 64
End: 2024-04-01
Payer: COMMERCIAL

## 2024-04-01 VITALS
TEMPERATURE: 97.7 F | DIASTOLIC BLOOD PRESSURE: 72 MMHG | WEIGHT: 287 LBS | HEART RATE: 70 BPM | OXYGEN SATURATION: 99 % | SYSTOLIC BLOOD PRESSURE: 138 MMHG | HEIGHT: 65 IN | BODY MASS INDEX: 47.82 KG/M2

## 2024-04-01 DIAGNOSIS — E66.01 OBESITY, CLASS III, BMI 40-49.9 (MORBID OBESITY) (HCC): ICD-10-CM

## 2024-04-01 DIAGNOSIS — I87.2 CHRONIC VENOUS INSUFFICIENCY: ICD-10-CM

## 2024-04-01 DIAGNOSIS — G89.29 CHRONIC PAIN OF RIGHT KNEE: ICD-10-CM

## 2024-04-01 DIAGNOSIS — M25.561 CHRONIC PAIN OF RIGHT KNEE: ICD-10-CM

## 2024-04-01 DIAGNOSIS — I10 ESSENTIAL HYPERTENSION, BENIGN: ICD-10-CM

## 2024-04-01 DIAGNOSIS — E78.1 HYPERTRIGLYCERIDEMIA: ICD-10-CM

## 2024-04-01 DIAGNOSIS — I10 BENIGN ESSENTIAL HTN: Primary | ICD-10-CM

## 2024-04-01 PROBLEM — R05.8 POST-VIRAL COUGH SYNDROME: Status: RESOLVED | Noted: 2024-02-19 | Resolved: 2024-04-01

## 2024-04-01 PROBLEM — Z96.651 STATUS POST TOTAL RIGHT KNEE REPLACEMENT: Status: RESOLVED | Noted: 2023-02-23 | Resolved: 2024-04-01

## 2024-04-01 PROCEDURE — 99214 OFFICE O/P EST MOD 30 MIN: CPT

## 2024-04-01 RX ORDER — HYDROCHLOROTHIAZIDE 50 MG/1
50 TABLET ORAL DAILY
Qty: 90 TABLET | Refills: 1 | Status: SHIPPED | OUTPATIENT
Start: 2024-04-01

## 2024-04-01 RX ORDER — METOPROLOL SUCCINATE 100 MG/1
100 TABLET, EXTENDED RELEASE ORAL DAILY
Qty: 90 TABLET | Refills: 1 | Status: SHIPPED | OUTPATIENT
Start: 2024-04-01

## 2024-04-01 RX ORDER — LOSARTAN POTASSIUM 100 MG/1
100 TABLET ORAL DAILY
Qty: 90 TABLET | Refills: 1 | Status: SHIPPED | OUTPATIENT
Start: 2024-04-01

## 2024-04-01 NOTE — ASSESSMENT & PLAN NOTE
A1c 5.6  Encouraged healthy diet and exercise habits  Patient states that she plans to walk more as the weather becomes nicer  Offered bariatric nutrition services referrals, patient declines at this time

## 2024-04-01 NOTE — ASSESSMENT & PLAN NOTE
Blood pressure moderately controlled, 138/72 today  Currently taking hydrochlorothiazide 50 mg daily, losartan 100 mg daily, metoprolol 100 mg daily  Discussed sleep apnea testing.  Patient is not interested at this time  Advised to monitor blood pressure at home.  Goal BP is < 130/80.  Contact our office for consistent elevations.    Recommend low sodium diet.  Exercise 30 minutes 5 times a week as tolerated.  Recommend yearly eye exam.

## 2024-04-01 NOTE — PROGRESS NOTES
Assessment/Plan:    1. Benign essential HTN  Assessment & Plan:  Blood pressure moderately controlled, 138/72 today  Currently taking hydrochlorothiazide 50 mg daily, losartan 100 mg daily, metoprolol 100 mg daily  Discussed sleep apnea testing.  Patient is not interested at this time  Advised to monitor blood pressure at home.  Goal BP is < 130/80.  Contact our office for consistent elevations.    Recommend low sodium diet.  Exercise 30 minutes 5 times a week as tolerated.  Recommend yearly eye exam.      Orders:  -     Comprehensive metabolic panel; Future  -     CBC and differential; Future  -     UA w Reflex to Microscopic w Reflex to Culture; Future  -     Comprehensive metabolic panel  -     CBC and differential    2. Hypertriglyceridemia  Assessment & Plan:  Total cholesterol 181, triglycerides 145, HDL 53, LDL 99  Recommend healthy diet and exercise habits  Will repeat in 6 months    Orders:  -     Lipid Panel with Direct LDL reflex; Future  -     Lipid Panel with Direct LDL reflex    3. Obesity, Class III, BMI 40-49.9 (morbid obesity) (Formerly [facility])  Assessment & Plan:  A1c 5.6  Encouraged healthy diet and exercise habits  Patient states that she plans to walk more as the weather becomes nicer  Offered bariatric nutrition services referrals, patient declines at this time    Orders:  -     Hemoglobin A1C; Future  -     TSH, 3rd generation with Free T4 reflex; Future  -     Hemoglobin A1C  -     TSH, 3rd generation with Free T4 reflex    4. Chronic pain of right knee  Assessment & Plan:  Status post bilateral total knee replacement, doing well      5. Chronic venous insufficiency  Assessment & Plan:  Asymptomatic, continue to monitor      6. Essential hypertension, benign  -     metoprolol succinate (TOPROL-XL) 100 mg 24 hr tablet; Take 1 tablet (100 mg total) by mouth daily  -     losartan (COZAAR) 100 MG tablet; Take 1 tablet (100 mg total) by mouth daily  -     hydroCHLOROthiazide 50 mg tablet; Take 1 tablet (50  mg total) by mouth daily              [employer] software was used to dictate this note.  It may contain errors with dictating incorrect words or incorrect spelling. Please contact the provider directly with any questions.    Subjective:      Patient ID: Jody Mendenhall is a 63 y.o. female.    Patient is a 63-year-old female presenting to the office for follow-up and lab review from 3/28/24  She has no acute concerns today  Notes that diet and exercise habits have been poor recently    BMP: Fasting glucose 104, kidney function and electrolytes within normal limits  A1c 5.6  Lipid panel: Total cholesterol 181, triglycerides 145, HDL 53, LDL 99  TSH 0.62  CBC within normal limits  Urinalysis without normal limits          The following portions of the patient's history were reviewed and updated as appropriate: allergies, current medications, past family history, past medical history, past social history, past surgical history, and problem list.    Review of Systems   Constitutional:  Negative for chills, fatigue and fever.   HENT:  Negative for congestion, ear pain, postnasal drip, rhinorrhea, sinus pressure, sore throat and trouble swallowing.    Eyes:  Negative for pain and visual disturbance.   Respiratory:  Negative for cough, shortness of breath and wheezing.    Cardiovascular:  Negative for chest pain, palpitations and leg swelling.   Gastrointestinal:  Negative for abdominal pain, nausea and vomiting.   Genitourinary:  Negative for difficulty urinating, dysuria and hematuria.   Musculoskeletal:  Negative for arthralgias and back pain.   Skin:  Negative for color change, rash and wound.   Neurological:  Negative for dizziness, weakness, light-headedness, numbness and headaches.   Psychiatric/Behavioral:  Negative for dysphoric mood and sleep disturbance. The patient is not nervous/anxious.    All other systems reviewed and are negative.        Past Medical History:   Diagnosis Date    Allergic     Anemia     Enlarged  thyroid     GERD (gastroesophageal reflux disease)     Hypertension     Obesity     Osteoarthritis of both knees 11/26/2018    Status post total right knee replacement 02/23/2023         Current Outpatient Medications:     aspirin (ECOTRIN LOW STRENGTH) 81 mg EC tablet, Take 1 tablet (81 mg total) by mouth daily, Disp: 90 tablet, Rfl: 1    Calcium Carb-Cholecalciferol (CALCIUM 600/VITAMIN D PO), Take 1 tablet by mouth in the morning, Disp: , Rfl:     hydroCHLOROthiazide 50 mg tablet, Take 1 tablet (50 mg total) by mouth daily, Disp: 90 tablet, Rfl: 1    losartan (COZAAR) 100 MG tablet, Take 1 tablet (100 mg total) by mouth daily, Disp: 90 tablet, Rfl: 1    metoprolol succinate (TOPROL-XL) 100 mg 24 hr tablet, Take 1 tablet (100 mg total) by mouth daily, Disp: 90 tablet, Rfl: 1    Multiple Vitamins-Minerals (CENTRUM SILVER) tablet, Take 1 tablet by mouth daily, Disp: , Rfl:     Allergies   Allergen Reactions    Morphine Vomiting    Percocet [Oxycodone-Acetaminophen] GI Intolerance    Hydrocodone-Acetaminophen GI Intolerance       Social History   Past Surgical History:   Procedure Laterality Date    COLONOSCOPY      ELBOW SURGERY      Boken no surgery    ENDOMETRIAL ABLATION      KNEE ARTHROSCOPY      WISDOM TOOTH EXTRACTION       Family History   Problem Relation Age of Onset    Hypertension Mother         benign essential     Heart attack Mother         recent     Hypertension Father         benign essential     Rheum arthritis Father     No Known Problems Sister     No Known Problems Sister     Stroke Daughter         as infant    Osteoarthritis Maternal Grandmother     No Known Problems Maternal Grandfather     Breast cancer Paternal Grandmother 85    No Known Problems Paternal Grandfather     Cervical cancer Maternal Aunt 78    Colon cancer Maternal Aunt 60    No Known Problems Paternal Aunt        Objective:  /72 (BP Location: Left arm, Patient Position: Sitting, Cuff Size: Large)   Pulse 70   Temp 97.7  "°F (36.5 °C) (Temporal)   Ht 5' 5\" (1.651 m)   Wt 130 kg (287 lb)   SpO2 99% Comment: room air  BMI 47.76 kg/m²      Physical Exam  Vitals and nursing note reviewed.   Constitutional:       General: She is not in acute distress.     Appearance: Normal appearance. She is not ill-appearing.   HENT:      Head: Normocephalic and atraumatic.      Right Ear: External ear normal.      Left Ear: External ear normal.      Nose: Nose normal.      Mouth/Throat:      Mouth: Mucous membranes are moist.      Pharynx: Oropharynx is clear. No posterior oropharyngeal erythema.   Eyes:      General: No scleral icterus.     Conjunctiva/sclera: Conjunctivae normal.      Pupils: Pupils are equal, round, and reactive to light.   Cardiovascular:      Rate and Rhythm: Normal rate and regular rhythm.      Heart sounds: Normal heart sounds. No murmur heard.     No friction rub. No gallop.   Pulmonary:      Effort: Pulmonary effort is normal. No respiratory distress.      Breath sounds: Normal breath sounds. No wheezing, rhonchi or rales.   Chest:      Chest wall: No tenderness.   Musculoskeletal:      Right lower leg: No edema.      Left lower leg: No edema.   Skin:     General: Skin is warm and dry.      Coloration: Skin is not pale.      Findings: No erythema.   Neurological:      General: No focal deficit present.      Mental Status: She is alert and oriented to person, place, and time. Mental status is at baseline.   Psychiatric:         Mood and Affect: Mood normal.         Behavior: Behavior normal.           Disclaimer: This note was generated with voice recognition software.  Phonetic, grammatical, and spelling errors may be present as a result.  Please contact provider with any concerns or questions    "

## 2024-04-01 NOTE — ASSESSMENT & PLAN NOTE
Total cholesterol 181, triglycerides 145, HDL 53, LDL 99  Recommend healthy diet and exercise habits  Will repeat in 6 months

## 2024-06-13 DIAGNOSIS — I10 ESSENTIAL HYPERTENSION, BENIGN: ICD-10-CM

## 2024-06-13 RX ORDER — LOSARTAN POTASSIUM 100 MG/1
100 TABLET ORAL DAILY
Qty: 90 TABLET | Refills: 1 | Status: SHIPPED | OUTPATIENT
Start: 2024-06-13

## 2024-06-13 RX ORDER — METOPROLOL SUCCINATE 100 MG/1
100 TABLET, EXTENDED RELEASE ORAL DAILY
Qty: 90 TABLET | Refills: 1 | Status: SHIPPED | OUTPATIENT
Start: 2024-06-13

## 2024-06-13 RX ORDER — HYDROCHLOROTHIAZIDE 50 MG/1
50 TABLET ORAL DAILY
Qty: 90 TABLET | Refills: 1 | Status: SHIPPED | OUTPATIENT
Start: 2024-06-13

## 2024-07-03 ENCOUNTER — TELEPHONE (OUTPATIENT)
Age: 64
End: 2024-07-03

## 2024-10-05 LAB
ALBUMIN SERPL-MCNC: 3.9 G/DL (ref 3.5–5.7)
ALP SERPL-CCNC: 100 U/L (ref 35–120)
ALT SERPL-CCNC: 17 U/L
ANION GAP SERPL CALCULATED.3IONS-SCNC: 8 MMOL/L (ref 3–11)
AST SERPL-CCNC: 16 U/L
BASOPHILS # BLD AUTO: 0.1 THOU/CMM (ref 0–0.1)
BASOPHILS NFR BLD AUTO: 1 %
BILIRUB SERPL-MCNC: 0.6 MG/DL (ref 0.2–1)
BUN SERPL-MCNC: 15 MG/DL (ref 7–25)
CALCIUM SERPL-MCNC: 9.5 MG/DL (ref 8.5–10.1)
CHLORIDE SERPL-SCNC: 101 MMOL/L (ref 100–109)
CHOLEST SERPL-MCNC: 182 MG/DL
CHOLEST/HDLC SERPL: 3.7 {RATIO}
CO2 SERPL-SCNC: 31 MMOL/L (ref 21–31)
CREAT SERPL-MCNC: 0.7 MG/DL (ref 0.4–1.1)
CYTOLOGY CMNT CVX/VAG CYTO-IMP: ABNORMAL
DIFFERENTIAL METHOD BLD: NORMAL
EOSINOPHIL # BLD AUTO: 0.2 THOU/CMM (ref 0–0.5)
EOSINOPHIL NFR BLD AUTO: 3 %
ERYTHROCYTE [DISTWIDTH] IN BLOOD BY AUTOMATED COUNT: 13.3 % (ref 12–16)
EST. AVERAGE GLUCOSE BLD GHB EST-MCNC: 114 MG/DL
GFR/BSA.PRED SERPLBLD CYS-BASED-ARV: 97 ML/MIN/{1.73_M2}
GLUCOSE SERPL-MCNC: 118 MG/DL (ref 65–99)
GLUCOSE UR QL STRIP: NEGATIVE MG/DL
HBA1C MFR BLD: 5.6 %
HCT VFR BLD AUTO: 40.3 % (ref 35–43)
HDLC SERPL-MCNC: 49 MG/DL (ref 23–92)
HGB BLD-MCNC: 13.9 G/DL (ref 11.5–14.5)
HGB UR QL STRIP: NEGATIVE MG/DL
KETONES UR QL STRIP: NEGATIVE MG/DL
LDLC SERPL CALC-MCNC: 97 MG/DL
LEGIONELLA SPEC CULT: NORMAL
LEUKOCYTE ESTERASE UR QL STRIP: NEGATIVE /UL
LYMPHOCYTES # BLD AUTO: 1.2 THOU/CMM (ref 1–3)
LYMPHOCYTES NFR BLD AUTO: 14 %
MCH RBC QN AUTO: 30.4 PG (ref 26–34)
MCHC RBC AUTO-ENTMCNC: 34.3 G/DL (ref 32–37)
MCV RBC AUTO: 89 FL (ref 80–100)
MONOCYTES # BLD AUTO: 0.8 THOU/CMM (ref 0.3–1)
MONOCYTES NFR BLD AUTO: 9 %
NEUTROPHILS # BLD AUTO: 6.4 THOU/CMM (ref 1.8–7.8)
NEUTROPHILS NFR BLD AUTO: 73 %
NITRITE UR QL STRIP: NEGATIVE
NONHDLC SERPL-MCNC: 133 MG/DL
PH UR: 7 [PH] (ref 4.5–8)
PLATELET # BLD AUTO: 220 THOU/CMM (ref 140–350)
PMV BLD REES-ECKER: 9.6 FL (ref 7.5–11.3)
POTASSIUM SERPL-SCNC: 3.7 MMOL/L (ref 3.5–5.2)
PROT 24H UR-MRATE: NEGATIVE MG/DL
PROT SERPL-MCNC: 6.8 G/DL (ref 6.3–8.3)
RBC # BLD AUTO: 4.56 MILL/CMM (ref 3.7–4.7)
SL AMB POCT URINE COMMENT: NORMAL
SODIUM SERPL-SCNC: 140 MMOL/L (ref 135–145)
SP GR UR: 1.01 (ref 1–1.03)
TRIGL SERPL-MCNC: 180 MG/DL
TSH SERPL-ACNC: 0.69 UIU/ML (ref 0.45–5.33)
WBC # BLD AUTO: 8.7 THOU/CMM (ref 4–10)

## 2024-10-21 ENCOUNTER — OFFICE VISIT (OUTPATIENT)
Dept: INTERNAL MEDICINE CLINIC | Facility: OTHER | Age: 64
End: 2024-10-21
Payer: COMMERCIAL

## 2024-10-21 VITALS
OXYGEN SATURATION: 98 % | WEIGHT: 291 LBS | TEMPERATURE: 97.8 F | HEART RATE: 65 BPM | SYSTOLIC BLOOD PRESSURE: 136 MMHG | BODY MASS INDEX: 48.42 KG/M2 | DIASTOLIC BLOOD PRESSURE: 88 MMHG

## 2024-10-21 DIAGNOSIS — R73.01 IMPAIRED FASTING BLOOD SUGAR: ICD-10-CM

## 2024-10-21 DIAGNOSIS — E78.1 HYPERTRIGLYCERIDEMIA: ICD-10-CM

## 2024-10-21 DIAGNOSIS — E66.01 OBESITY, CLASS III, BMI 40-49.9 (MORBID OBESITY) (HCC): ICD-10-CM

## 2024-10-21 DIAGNOSIS — I10 BENIGN ESSENTIAL HTN: Primary | ICD-10-CM

## 2024-10-21 PROBLEM — M79.604 PAIN IN BOTH LOWER EXTREMITIES: Status: RESOLVED | Noted: 2018-10-25 | Resolved: 2024-10-21

## 2024-10-21 PROBLEM — G89.29 CHRONIC PAIN OF RIGHT KNEE: Status: RESOLVED | Noted: 2017-06-15 | Resolved: 2024-10-21

## 2024-10-21 PROBLEM — M25.561 CHRONIC PAIN OF RIGHT KNEE: Status: RESOLVED | Noted: 2017-06-15 | Resolved: 2024-10-21

## 2024-10-21 PROBLEM — M79.605 PAIN IN BOTH LOWER EXTREMITIES: Status: RESOLVED | Noted: 2018-10-25 | Resolved: 2024-10-21

## 2024-10-21 PROCEDURE — 99214 OFFICE O/P EST MOD 30 MIN: CPT

## 2024-10-21 RX ORDER — AMLODIPINE BESYLATE 2.5 MG/1
2.5 TABLET ORAL DAILY
Qty: 100 TABLET | Refills: 1 | Status: SHIPPED | OUTPATIENT
Start: 2024-10-21

## 2024-10-21 NOTE — ASSESSMENT & PLAN NOTE
A1c 5.6  Encouraged healthy diet and exercise habits  Offered bariatric and nutrition service referrals, patient declines as she states it is difficult to find the time to attend these appointments  I did discussed metformin and GLP-1 agonist with patient given her history of impaired fasting glucose.  Denies history of thyroid cancer, men syndrome, pancreatitis, gallstones  Patient states she would like you to be getting insurance, will prefer to wait on medications until she obtains her new insurance

## 2024-10-21 NOTE — ASSESSMENT & PLAN NOTE
Lipid panel: Total cholesterol 182, triglycerides 180, HDL 49, LDL 97  ASCVD risk or 6.3%  Add omega-3 daily  Recommend healthy lifestyle choices for your cholesterol.  Low fat/low cholesterol diet.  Limit/avoid red meat.  Eat more lean meat - chicken breast, ground turkey, fish.  Exercise 30 mins at least 5 times a week as tolerated.     Repeat 6 months    Orders:    Lipid Panel with Direct LDL reflex; Future    Lipid Panel with Direct LDL reflex

## 2024-10-21 NOTE — ASSESSMENT & PLAN NOTE
Fasting glucose 118, A1c 5.6  Continue to monitor A1c and glucose every 6 months  Discussed healthy diet and exercise habits  Orders:    Hemoglobin A1C; Future    Hemoglobin A1C

## 2024-10-21 NOTE — ASSESSMENT & PLAN NOTE
Blood pressure moderately controlled.  Blood pressures over the last few visits have been high 130s systolic.  Discussed with patient that this is not completely adequate control of her blood pressure  Currently taking hydrochlorothiazide 50 mg daily, losartan 100 mg daily, metoprolol 100 mg daily  Add amlodipine 2.5 mg daily  Advised to monitor blood pressure at home.  Goal BP is < 130/80.  Record readings and report back to office in 2 weeks for possible medication adjustments  Recommend low sodium diet.  Exercise 30 minutes 5 times a week as tolerated.  Recommend yearly eye exam.    Discussed sleep apnea testing, however patient not interested  Orders:    amLODIPine (NORVASC) 2.5 mg tablet; Take 1 tablet (2.5 mg total) by mouth daily    Comprehensive metabolic panel; Future    TSH, 3rd generation with Free T4 reflex; Future    CBC and differential; Future    Comprehensive metabolic panel    TSH, 3rd generation with Free T4 reflex    CBC and differential

## 2024-10-21 NOTE — PROGRESS NOTES
Ambulatory Visit  Name: Jody Mendenhall      : 1960      MRN: [id number]  Encounter Provider: Connie Rivera PA-C  Encounter Date: 10/21/2024   Encounter department: [facility] PRIMARY [facility] [place]    Assessment & Plan  Benign essential HTN  Blood pressure moderately controlled.  Blood pressures over the last few visits have been high 130s systolic.  Discussed with patient that this is not completely adequate control of her blood pressure  Currently taking hydrochlorothiazide 50 mg daily, losartan 100 mg daily, metoprolol 100 mg daily  Add amlodipine 2.5 mg daily  Advised to monitor blood pressure at home.  Goal BP is < 130/80.  Record readings and report back to office in 2 weeks for possible medication adjustments  Recommend low sodium diet.  Exercise 30 minutes 5 times a week as tolerated.  Recommend yearly eye exam.    Discussed sleep apnea testing, however patient not interested  Orders:    amLODIPine (NORVASC) 2.5 mg tablet; Take 1 tablet (2.5 mg total) by mouth daily    Comprehensive metabolic panel; Future    TSH, 3rd generation with Free T4 reflex; Future    CBC and differential; Future    Comprehensive metabolic panel    TSH, 3rd generation with Free T4 reflex    CBC and differential    Hypertriglyceridemia  Lipid panel: Total cholesterol 182, triglycerides 180, HDL 49, LDL 97  ASCVD risk or 6.3%  Add omega-3 daily  Recommend healthy lifestyle choices for your cholesterol.  Low fat/low cholesterol diet.  Limit/avoid red meat.  Eat more lean meat - chicken breast, ground turkey, fish.  Exercise 30 mins at least 5 times a week as tolerated.     Repeat 6 months    Orders:    Lipid Panel with Direct LDL reflex; Future    Lipid Panel with Direct LDL reflex    Impaired fasting blood sugar  Fasting glucose 118, A1c 5.6  Continue to monitor A1c and glucose every 6 months  Discussed healthy diet and exercise habits  Orders:    Hemoglobin A1C; Future    Hemoglobin A1C    Obesity, Class  III, BMI 40-49.9 (morbid obesity) ([facility])  A1c 5.6  Encouraged healthy diet and exercise habits  Offered bariatric and nutrition service referrals, patient declines as she states it is difficult to find the time to attend these appointments  I did discussed metformin and GLP-1 agonist with patient given her history of impaired fasting glucose.  Denies history of thyroid cancer, men syndrome, pancreatitis, gallstones  Patient states she would like you to be getting insurance, will prefer to wait on medications until she obtains her new insurance            History of Present Illness     Patient is a 63-year-old female presenting to the office for 6-month follow-up of chronic conditions  Labs reviewed with patient  CMP: Fasting glucose 118, otherwise unremarkable  Lipid panel: Total cholesterol 182, triglycerides 180, HDL 49, LDL 97  CBC, TSH WNL  A1c 5.6  Urine: Unremarkable    She has no acute concerns today  She reports she did not check her blood pressure at home    Patient does note that her mother was recently diagnosed with breast cancer age 80.  She notes mother did complete genetic testing, was negative for genetic component to breast cancer            Review of Systems   Constitutional:  Negative for chills, fatigue and fever.   HENT:  Negative for congestion, ear pain, postnasal drip, rhinorrhea, sinus pressure, sore throat and trouble swallowing.    Eyes:  Negative for pain and visual disturbance.   Respiratory:  Negative for cough, shortness of breath and wheezing.    Cardiovascular:  Negative for chest pain, palpitations and leg swelling.   Gastrointestinal:  Negative for abdominal pain, constipation, diarrhea, nausea and vomiting.   Genitourinary:  Negative for difficulty urinating, dysuria and hematuria.   Neurological:  Negative for dizziness, weakness, light-headedness, numbness and headaches.   All other systems reviewed and are negative.    Medical History Reviewed by provider this encounter:   Tobacco  Allergies  Meds  Problems  Med Hx  Surg Hx  Fam Hx           Objective     /88 (BP Location: Right arm, Patient Position: Sitting, Cuff Size: Standard)   Pulse 65   Temp 97.8 °F (36.6 °C) (Temporal)   Wt 132 kg (291 lb)   SpO2 98%   BMI 48.42 kg/m²     Physical Exam  Vitals and nursing note reviewed.   Constitutional:       General: She is not in acute distress.     Appearance: Normal appearance. She is not ill-appearing.   HENT:      Head: Normocephalic and atraumatic.      Right Ear: External ear normal.      Left Ear: External ear normal.      Nose: Nose normal.      Mouth/Throat:      Mouth: Mucous membranes are moist.      Pharynx: Oropharynx is clear.   Eyes:      General: No scleral icterus.     Conjunctiva/sclera: Conjunctivae normal.      Pupils: Pupils are equal, round, and reactive to light.   Cardiovascular:      Rate and Rhythm: Normal rate and regular rhythm.      Heart sounds: Normal heart sounds. No murmur heard.     No friction rub. No gallop.   Pulmonary:      Effort: Pulmonary effort is normal. No respiratory distress.      Breath sounds: Normal breath sounds. No wheezing, rhonchi or rales.   Chest:      Chest wall: No tenderness.   Musculoskeletal:      Right lower leg: No edema.      Left lower leg: No edema.   Skin:     General: Skin is warm and dry.      Coloration: Skin is not pale.      Findings: No erythema, lesion or rash.   Neurological:      General: No focal deficit present.      Mental Status: She is alert and oriented to person, place, and time. Mental status is at baseline.   Psychiatric:         Mood and Affect: Mood normal.         Behavior: Behavior normal.       Administrative Statements     Disclaimer: This note was generated with voice recognition software.  Phonetic, grammatical, and spelling errors may be present as a result.  Please contact provider with any concerns or questions

## 2024-11-07 ENCOUNTER — NURSE TRIAGE (OUTPATIENT)
Age: 64
End: 2024-11-07

## 2024-11-07 NOTE — TELEPHONE ENCOUNTER
"I reviewed OTC cold medications safe to take with Hbp utilizing LexiDrug.   Reason for Disposition   Caller has medicine question, adult has minor symptoms, caller declines triage, and triager answers question    Answer Assessment - Initial Assessment Questions  1. NAME of MEDICINE: \"What medicine(s) are you calling about?\"      Cold medication  2. QUESTION: \"What is your question?\" (e.g., double dose of medicine, side effect)      What medications can I take with HPB  3. PRESCRIBER: \"Who prescribed the medicine?\" Reason: if prescribed by specialist, call should be referred to that group.      N/a-OTC  4. SYMPTOMS: \"Do you have any symptoms?\" If Yes, ask: \"What symptoms are you having?\"  \"How bad are the symptoms (e.g., mild, moderate, severe)      Mild cold symptoms    Protocols used: Medication Question Call-Adult-[place]    "

## 2024-11-25 DIAGNOSIS — I10 ESSENTIAL HYPERTENSION, BENIGN: ICD-10-CM

## 2024-11-25 RX ORDER — METOPROLOL SUCCINATE 100 MG/1
100 TABLET, EXTENDED RELEASE ORAL DAILY
Qty: 90 TABLET | Refills: 1 | Status: SHIPPED | OUTPATIENT
Start: 2024-11-25

## 2024-11-25 RX ORDER — LOSARTAN POTASSIUM 100 MG/1
100 TABLET ORAL DAILY
Qty: 90 TABLET | Refills: 1 | Status: SHIPPED | OUTPATIENT
Start: 2024-11-25

## 2024-11-26 ENCOUNTER — TELEPHONE (OUTPATIENT)
Dept: INTERNAL MEDICINE CLINIC | Facility: OTHER | Age: 64
End: 2024-11-26

## 2024-11-26 VITALS — SYSTOLIC BLOOD PRESSURE: 116 MMHG | DIASTOLIC BLOOD PRESSURE: 67 MMHG

## 2024-12-26 ENCOUNTER — HOSPITAL ENCOUNTER (OUTPATIENT)
Dept: MAMMOGRAPHY | Facility: CLINIC | Age: 64
End: 2024-12-26
Payer: COMMERCIAL

## 2024-12-26 VITALS — WEIGHT: 291 LBS | HEIGHT: 65 IN | BODY MASS INDEX: 48.48 KG/M2

## 2024-12-26 DIAGNOSIS — R92.8 ABNORMAL MAMMOGRAM: ICD-10-CM

## 2024-12-26 PROCEDURE — 77066 DX MAMMO INCL CAD BI: CPT

## 2024-12-26 PROCEDURE — 76642 ULTRASOUND BREAST LIMITED: CPT

## 2024-12-26 PROCEDURE — G0279 TOMOSYNTHESIS, MAMMO: HCPCS

## 2025-01-20 ENCOUNTER — APPOINTMENT (OUTPATIENT)
Dept: URGENT CARE | Facility: MEDICAL CENTER | Age: 65
End: 2025-01-20

## 2025-01-24 DIAGNOSIS — I10 ESSENTIAL HYPERTENSION, BENIGN: ICD-10-CM

## 2025-01-24 RX ORDER — HYDROCHLOROTHIAZIDE 50 MG/1
50 TABLET ORAL DAILY
Qty: 90 TABLET | Refills: 1 | Status: SHIPPED | OUTPATIENT
Start: 2025-01-24

## 2025-01-24 NOTE — TELEPHONE ENCOUNTER
Reason for call:   [x] Refill   [] Prior Auth  [] Other:     Office:   [x] PCP/Provider - Manolo Mcdonnell MD   [] Specialty/Provider -     Medication: hydroCHLOROthiazide 50 mg tablet / Take 1 tablet (50 mg total) by mouth daily     Pharmacy: [address]     Does the patient have enough for 3 days?   [x] Yes   [] No - Send as HP to POD

## 2025-02-06 ENCOUNTER — TELEPHONE (OUTPATIENT)
Dept: INTERNAL MEDICINE CLINIC | Age: 65
End: 2025-02-06

## 2025-02-06 ENCOUNTER — OFFICE VISIT (OUTPATIENT)
Dept: INTERNAL MEDICINE CLINIC | Age: 65
End: 2025-02-06
Payer: COMMERCIAL

## 2025-02-06 VITALS
DIASTOLIC BLOOD PRESSURE: 74 MMHG | SYSTOLIC BLOOD PRESSURE: 126 MMHG | HEIGHT: 65 IN | TEMPERATURE: 98.4 F | OXYGEN SATURATION: 99 % | HEART RATE: 80 BPM | WEIGHT: 291 LBS | BODY MASS INDEX: 48.48 KG/M2

## 2025-02-06 DIAGNOSIS — R35.0 FREQUENCY OF URINATION: Primary | ICD-10-CM

## 2025-02-06 DIAGNOSIS — R39.9 UTI SYMPTOMS: ICD-10-CM

## 2025-02-06 LAB
SL AMB  POCT GLUCOSE, UA: ABNORMAL
SL AMB LEUKOCYTE ESTERASE,UA: ABNORMAL
SL AMB POCT BILIRUBIN,UA: ABNORMAL
SL AMB POCT BLOOD,UA: ABNORMAL
SL AMB POCT CLARITY,UA: ABNORMAL
SL AMB POCT COLOR,UA: YELLOW
SL AMB POCT KETONES,UA: ABNORMAL
SL AMB POCT NITRITE,UA: ABNORMAL
SL AMB POCT PH,UA: 6.5
SL AMB POCT SPECIFIC GRAVITY,UA: 1.02
SL AMB POCT URINE PROTEIN: 0.2

## 2025-02-06 PROCEDURE — 99213 OFFICE O/P EST LOW 20 MIN: CPT | Performed by: PHYSICIAN ASSISTANT

## 2025-02-06 PROCEDURE — 81003 URINALYSIS AUTO W/O SCOPE: CPT | Performed by: PHYSICIAN ASSISTANT

## 2025-02-06 RX ORDER — CIPROFLOXACIN 500 MG/1
500 TABLET, FILM COATED ORAL EVERY 12 HOURS SCHEDULED
Qty: 10 TABLET | Refills: 0 | Status: SHIPPED | OUTPATIENT
Start: 2025-02-06 | End: 2025-02-11

## 2025-02-06 NOTE — PROGRESS NOTES
"Name: Jody Mendenhall      : 1960      MRN: [id number]  Encounter Provider: Annmarie Mcgee PA-C  Encounter Date: 2025   Encounter department: [facility] PRIMARY [facility] [place]  :  Assessment & Plan  Frequency of urination  Increase fluids  Take daily probiotic or yogurt/cottage cheese     Orders:    POCT urine dip auto non-scope    Urine culture    ciprofloxacin (CIPRO) 500 mg tablet; Take 1 tablet (500 mg total) by mouth every 12 (twelve) hours for 5 days    UTI symptoms                History of Present Illness   65 y/o female with c/o UTI sx  States she has had increased urination for the past week or so    Pt reports she also has lower R sided tooth pain for about the same length of time       Review of Systems   Constitutional:  Negative for appetite change, chills, diaphoresis, fatigue and fever.   HENT:  Negative for congestion and sore throat.    Eyes:  Negative for pain and redness.   Respiratory:  Negative for cough, shortness of breath and wheezing.    Cardiovascular:  Negative for chest pain and leg swelling.   Gastrointestinal:  Negative for abdominal pain, constipation, diarrhea and nausea.   Genitourinary:  Positive for dysuria and frequency. Negative for hematuria.   Musculoskeletal:  Negative for arthralgias, back pain and gait problem.   Skin:  Negative for rash.   Neurological:  Negative for dizziness, light-headedness and headaches.   Psychiatric/Behavioral:  Negative for sleep disturbance. The patient is not nervous/anxious.        Objective   /74 (BP Location: Left arm, Patient Position: Sitting, Cuff Size: Large)   Pulse 80   Temp 98.4 °F (36.9 °C)   Ht 5' 5\" (1.651 m)   Wt 132 kg (291 lb)   SpO2 99%   BMI 48.42 kg/m²      Physical Exam  Vitals reviewed.   Constitutional:       General: She is not in acute distress.  HENT:      Head: Normocephalic.   Cardiovascular:      Rate and Rhythm: Normal rate and regular rhythm.   Pulmonary:      Effort: Pulmonary " effort is normal. No respiratory distress.      Breath sounds: Normal breath sounds. No wheezing or rales.   Musculoskeletal:      Cervical back: Normal range of motion. No rigidity.   Neurological:      Mental Status: She is alert.

## 2025-02-06 NOTE — TELEPHONE ENCOUNTER
[facility]  service has been contacted with online [facility]  services  Confirmation number Is:  [id number].

## 2025-02-08 LAB — LEGIONELLA SPEC CULT: NORMAL

## 2025-02-09 ENCOUNTER — NURSE TRIAGE (OUTPATIENT)
Dept: OTHER | Facility: OTHER | Age: 65
End: 2025-02-09

## 2025-02-09 DIAGNOSIS — A49.9 BACTERIAL URINARY INFECTION: Primary | ICD-10-CM

## 2025-02-09 DIAGNOSIS — N39.0 BACTERIAL URINARY INFECTION: Primary | ICD-10-CM

## 2025-02-09 RX ORDER — CEPHALEXIN 500 MG/1
500 CAPSULE ORAL EVERY 8 HOURS SCHEDULED
Qty: 15 CAPSULE | Refills: 0 | Status: SHIPPED | OUTPATIENT
Start: 2025-02-09 | End: 2025-02-14

## 2025-02-09 NOTE — TELEPHONE ENCOUNTER
"Patient calling to request alternative antibiotic to ciprofloaxin. She states the medication is causing her muscles to tense. She is on day 3/5 and reports urinary symptoms to have gotten better. On call Provider paged. New prescription sent to patient preferred pharmacy.     Reason for Disposition   [1] Caller has URGENT medicine question about med that PCP or specialist prescribed AND [2] triager unable to answer question    Answer Assessment - Initial Assessment Questions  1. NAME of MEDICINE: \"What medicine(s) are you calling about?\"        Ciprofloaxin    2. QUESTION: \"What is your question?\" (e.g., double dose of medicine, side effect)        Muscles are tensing due to the medication    3. PRESCRIBER: \"Who prescribed the medicine?\" Reason: if prescribed by specialist, call should be referred to that group.        Mercedes Mcgee    4. SYMPTOMS: \"Do you have any symptoms?\" If Yes, ask: \"What symptoms are you having?\"  \"How bad are the symptoms (e.g., mild, moderate, severe)        Yes, muscles and soreness from the recently prescribed antibiotic.    Protocols used: Medication Question Call-Adult-    "

## 2025-02-28 ENCOUNTER — TELEPHONE (OUTPATIENT)
Age: 65
End: 2025-02-28

## 2025-04-09 DIAGNOSIS — I10 BENIGN ESSENTIAL HTN: ICD-10-CM

## 2025-04-10 RX ORDER — AMLODIPINE BESYLATE 2.5 MG/1
2.5 TABLET ORAL DAILY
Qty: 100 TABLET | Refills: 1 | Status: SHIPPED | OUTPATIENT
Start: 2025-04-10

## 2025-04-17 ENCOUNTER — RA CDI HCC (OUTPATIENT)
Dept: OTHER | Facility: HOSPITAL | Age: 65
End: 2025-04-17

## 2025-04-17 NOTE — PROGRESS NOTES
Please review if this dx is applicable to the patient's condition and assess and document, if applicable in next visit          [id number]    HCC coding opportunities          Chart Reviewed number of suggestions sent to Provider: 1     Patients Insurance        Commercial Insurance: [employer]

## 2025-05-16 DIAGNOSIS — I10 ESSENTIAL HYPERTENSION, BENIGN: ICD-10-CM

## 2025-05-16 NOTE — TELEPHONE ENCOUNTER
Reason for call:   [x] Refill   [] Prior Auth  [] Other:     Office:   [x] PCP/Provider -   [] Specialty/Provider -     Medication:   losartan (COZAAR) 100 MG/Take 1 tablet (100 mg total) by mouth daily     metoprolol succinate (TOPROL-XL) 100 mg/Take 1 tablet (100 mg total) by mouth daily      Quantity: 90    Pharmacy: [address]   Does the patient have enough for 3 days?   [x] Yes   [] No - Send as HP to POD    Mail Away Pharmacy   Does the patient have enough for 10 days?   [] Yes   [] No - Send as HP to POD

## 2025-05-18 RX ORDER — METOPROLOL SUCCINATE 100 MG/1
100 TABLET, EXTENDED RELEASE ORAL DAILY
Qty: 90 TABLET | Refills: 1 | Status: SHIPPED | OUTPATIENT
Start: 2025-05-18

## 2025-05-18 RX ORDER — LOSARTAN POTASSIUM 100 MG/1
100 TABLET ORAL DAILY
Qty: 90 TABLET | Refills: 1 | Status: SHIPPED | OUTPATIENT
Start: 2025-05-18

## 2025-06-01 DIAGNOSIS — I10 ESSENTIAL HYPERTENSION, BENIGN: ICD-10-CM

## 2025-06-01 RX ORDER — HYDROCHLOROTHIAZIDE 50 MG/1
50 TABLET ORAL DAILY
Qty: 90 TABLET | Refills: 1 | Status: SHIPPED | OUTPATIENT
Start: 2025-06-01

## 2025-07-14 DIAGNOSIS — I10 ESSENTIAL HYPERTENSION, BENIGN: ICD-10-CM

## 2025-07-14 RX ORDER — HYDROCHLOROTHIAZIDE 50 MG/1
50 TABLET ORAL DAILY
Qty: 90 TABLET | Refills: 0 | Status: SHIPPED | OUTPATIENT
Start: 2025-07-14

## 2025-08-11 LAB
ALBUMIN SERPL-MCNC: 3.9 G/DL (ref 3.5–5.7)
ALP SERPL-CCNC: 100 U/L (ref 35–120)
ALT SERPL-CCNC: 18 U/L
ANION GAP SERPL CALCULATED.3IONS-SCNC: 6 MMOL/L (ref 3–11)
AST SERPL-CCNC: 17 U/L
BASOPHILS # BLD AUTO: 0.1 THOU/CMM (ref 0–0.1)
BASOPHILS NFR BLD AUTO: 1 %
BILIRUB SERPL-MCNC: 0.6 MG/DL (ref 0.2–1)
BUN SERPL-MCNC: 14 MG/DL (ref 7–25)
CALCIUM SERPL-MCNC: 9.6 MG/DL (ref 8.5–10.5)
CHLORIDE SERPL-SCNC: 103 MMOL/L (ref 100–109)
CHOLEST SERPL-MCNC: 181 MG/DL
CHOLEST/HDLC SERPL: 3.8 {RATIO}
CO2 SERPL-SCNC: 33 MMOL/L (ref 21–31)
CREAT SERPL-MCNC: 0.58 MG/DL (ref 0.4–1.1)
CYTOLOGY CMNT CVX/VAG CYTO-IMP: ABNORMAL
DIFFERENTIAL METHOD BLD: NORMAL
EOSINOPHIL # BLD AUTO: 0.3 THOU/CMM (ref 0–0.5)
EOSINOPHIL NFR BLD AUTO: 4 %
ERYTHROCYTE [DISTWIDTH] IN BLOOD BY AUTOMATED COUNT: 13.6 % (ref 12–16)
EST. AVERAGE GLUCOSE BLD GHB EST-MCNC: 114 MG/DL
GFR/BSA.PRED SERPLBLD CYS-BASED-ARV: 101 ML/MIN/{1.73_M2}
GLUCOSE SERPL-MCNC: 106 MG/DL (ref 65–99)
HBA1C MFR BLD: 5.6 %
HCT VFR BLD AUTO: 38.8 % (ref 35–43)
HDLC SERPL-MCNC: 48 MG/DL (ref 23–92)
HGB BLD-MCNC: 13.1 G/DL (ref 11.5–14.5)
LDLC SERPL CALC-MCNC: 97 MG/DL
LYMPHOCYTES # BLD AUTO: 1.2 THOU/CMM (ref 1–3)
LYMPHOCYTES NFR BLD AUTO: 14 %
MCH RBC QN AUTO: 29.9 PG (ref 26–34)
MCHC RBC AUTO-ENTMCNC: 33.6 G/DL (ref 32–37)
MCV RBC AUTO: 89 FL (ref 80–100)
MONOCYTES # BLD AUTO: 0.7 THOU/CMM (ref 0.3–1)
MONOCYTES NFR BLD AUTO: 9 %
NEUTROPHILS # BLD AUTO: 5.8 THOU/CMM (ref 1.8–7.8)
NEUTROPHILS NFR BLD AUTO: 72 %
NONHDLC SERPL-MCNC: 133 MG/DL
PLATELET # BLD AUTO: 236 THOU/CMM (ref 140–350)
PMV BLD REES-ECKER: 9.3 FL (ref 7.5–11.3)
POTASSIUM SERPL-SCNC: 4.3 MMOL/L (ref 3.5–5.2)
PROT SERPL-MCNC: 6.7 G/DL (ref 6.3–8.3)
RBC # BLD AUTO: 4.37 MILL/CMM (ref 3.7–4.7)
SODIUM SERPL-SCNC: 142 MMOL/L (ref 135–145)
TRIGL SERPL-MCNC: 180 MG/DL
TSH SERPL-ACNC: 0.87 UIU/ML (ref 0.45–5.33)
WBC # BLD AUTO: 8.1 THOU/CMM (ref 4–10)

## 2025-08-15 ENCOUNTER — OFFICE VISIT (OUTPATIENT)
Age: 65
End: 2025-08-15
Payer: COMMERCIAL

## 2025-08-15 PROBLEM — R39.9 UTI SYMPTOMS: Status: ACTIVE | Noted: 2025-08-15

## 2025-08-18 DIAGNOSIS — R39.9 UTI SYMPTOMS: Primary | ICD-10-CM

## 2025-08-18 RX ORDER — SULFAMETHOXAZOLE AND TRIMETHOPRIM 800; 160 MG/1; MG/1
1 TABLET ORAL EVERY 12 HOURS SCHEDULED
Qty: 6 TABLET | Refills: 0 | Status: SHIPPED | OUTPATIENT
Start: 2025-08-18 | End: 2025-08-21